# Patient Record
Sex: FEMALE | Race: WHITE | Employment: OTHER | URBAN - METROPOLITAN AREA
[De-identification: names, ages, dates, MRNs, and addresses within clinical notes are randomized per-mention and may not be internally consistent; named-entity substitution may affect disease eponyms.]

---

## 2023-01-09 ENCOUNTER — HOSPITAL ENCOUNTER (OUTPATIENT)
Dept: MAMMOGRAPHY | Facility: CLINIC | Age: 73
Discharge: HOME | End: 2023-01-09
Attending: FAMILY MEDICINE | Admitting: FAMILY MEDICINE
Payer: COMMERCIAL

## 2023-01-09 DIAGNOSIS — Z12.31 VISIT FOR SCREENING MAMMOGRAM: ICD-10-CM

## 2023-01-09 PROCEDURE — 77067 SCR MAMMO BI INCL CAD: CPT

## 2023-03-09 ENCOUNTER — HOSPITAL ENCOUNTER (OUTPATIENT)
Dept: MAMMOGRAPHY | Facility: CLINIC | Age: 73
Discharge: HOME | End: 2023-03-09
Attending: FAMILY MEDICINE
Payer: COMMERCIAL

## 2023-03-09 DIAGNOSIS — R92.8 ABNORMAL MAMMOGRAM: ICD-10-CM

## 2023-03-09 PROCEDURE — 77061 BREAST TOMOSYNTHESIS UNI: CPT | Mod: RT

## 2023-03-09 PROCEDURE — 76642 ULTRASOUND BREAST LIMITED: CPT | Mod: RT

## 2023-03-15 ENCOUNTER — HOSPITAL ENCOUNTER (OUTPATIENT)
Facility: CLINIC | Age: 73
Setting detail: OBSERVATION
Discharge: HOME | End: 2023-03-18
Attending: EMERGENCY MEDICINE | Admitting: EMERGENCY MEDICINE
Payer: COMMERCIAL

## 2023-03-15 DIAGNOSIS — R42 DIZZINESS: ICD-10-CM

## 2023-03-15 DIAGNOSIS — D64.9 ANEMIA, UNSPECIFIED TYPE: ICD-10-CM

## 2023-03-15 LAB
ALBUMIN SERPL BCG-MCNC: 3.8 G/DL (ref 3.5–5.2)
ALP SERPL-CCNC: 69 U/L (ref 35–104)
ALT SERPL W P-5'-P-CCNC: 11 U/L (ref 10–35)
ANION GAP SERPL CALCULATED.3IONS-SCNC: 9 MMOL/L (ref 7–15)
AST SERPL W P-5'-P-CCNC: 19 U/L (ref 10–35)
ATRIAL RATE - MUSE: 114 BPM
BASOPHILS # BLD AUTO: 0.1 10E3/UL (ref 0–0.2)
BASOPHILS NFR BLD AUTO: 1 %
BILIRUB SERPL-MCNC: 0.2 MG/DL
BUN SERPL-MCNC: 19.1 MG/DL (ref 8–23)
CALCIUM SERPL-MCNC: 9.1 MG/DL (ref 8.8–10.2)
CHLORIDE SERPL-SCNC: 105 MMOL/L (ref 98–107)
CREAT SERPL-MCNC: 0.67 MG/DL (ref 0.51–0.95)
DEPRECATED HCO3 PLAS-SCNC: 25 MMOL/L (ref 22–29)
DIASTOLIC BLOOD PRESSURE - MUSE: NORMAL MMHG
EOSINOPHIL # BLD AUTO: 0 10E3/UL (ref 0–0.7)
EOSINOPHIL NFR BLD AUTO: 0 %
ERYTHROCYTE [DISTWIDTH] IN BLOOD BY AUTOMATED COUNT: 11.9 % (ref 10–15)
GFR SERPL CREATININE-BSD FRML MDRD: >90 ML/MIN/1.73M2
GLUCOSE SERPL-MCNC: 107 MG/DL (ref 70–99)
HCT VFR BLD AUTO: 34.8 % (ref 35–47)
HGB BLD-MCNC: 11.3 G/DL (ref 11.7–15.7)
IMM GRANULOCYTES # BLD: 0 10E3/UL
IMM GRANULOCYTES NFR BLD: 0 %
INTERPRETATION ECG - MUSE: NORMAL
LYMPHOCYTES # BLD AUTO: 1.7 10E3/UL (ref 0.8–5.3)
LYMPHOCYTES NFR BLD AUTO: 36 %
MCH RBC QN AUTO: 29.8 PG (ref 26.5–33)
MCHC RBC AUTO-ENTMCNC: 32.5 G/DL (ref 31.5–36.5)
MCV RBC AUTO: 92 FL (ref 78–100)
MONOCYTES # BLD AUTO: 0.4 10E3/UL (ref 0–1.3)
MONOCYTES NFR BLD AUTO: 8 %
NEUTROPHILS # BLD AUTO: 2.6 10E3/UL (ref 1.6–8.3)
NEUTROPHILS NFR BLD AUTO: 55 %
NRBC # BLD AUTO: 0 10E3/UL
NRBC BLD AUTO-RTO: 0 /100
P AXIS - MUSE: 79 DEGREES
PLATELET # BLD AUTO: 274 10E3/UL (ref 150–450)
POTASSIUM SERPL-SCNC: 4.1 MMOL/L (ref 3.4–5.3)
PR INTERVAL - MUSE: 142 MS
PROT SERPL-MCNC: 6.5 G/DL (ref 6.4–8.3)
QRS DURATION - MUSE: 66 MS
QT - MUSE: 306 MS
QTC - MUSE: 421 MS
R AXIS - MUSE: 59 DEGREES
RBC # BLD AUTO: 3.79 10E6/UL (ref 3.8–5.2)
SODIUM SERPL-SCNC: 139 MMOL/L (ref 136–145)
SYSTOLIC BLOOD PRESSURE - MUSE: NORMAL MMHG
T AXIS - MUSE: 0 DEGREES
VENTRICULAR RATE- MUSE: 114 BPM
WBC # BLD AUTO: 4.8 10E3/UL (ref 4–11)

## 2023-03-15 PROCEDURE — 84484 ASSAY OF TROPONIN QUANT: CPT | Performed by: EMERGENCY MEDICINE

## 2023-03-15 PROCEDURE — 85025 COMPLETE CBC W/AUTO DIFF WBC: CPT | Performed by: EMERGENCY MEDICINE

## 2023-03-15 PROCEDURE — 96360 HYDRATION IV INFUSION INIT: CPT | Mod: XU

## 2023-03-15 PROCEDURE — 84439 ASSAY OF FREE THYROXINE: CPT | Performed by: EMERGENCY MEDICINE

## 2023-03-15 PROCEDURE — 83735 ASSAY OF MAGNESIUM: CPT | Performed by: EMERGENCY MEDICINE

## 2023-03-15 PROCEDURE — 99285 EMERGENCY DEPT VISIT HI MDM: CPT | Mod: 25

## 2023-03-15 PROCEDURE — 80053 COMPREHEN METABOLIC PANEL: CPT | Performed by: EMERGENCY MEDICINE

## 2023-03-15 PROCEDURE — 96361 HYDRATE IV INFUSION ADD-ON: CPT

## 2023-03-15 PROCEDURE — 36415 COLL VENOUS BLD VENIPUNCTURE: CPT | Performed by: EMERGENCY MEDICINE

## 2023-03-15 PROCEDURE — 84443 ASSAY THYROID STIM HORMONE: CPT | Performed by: EMERGENCY MEDICINE

## 2023-03-15 PROCEDURE — 93005 ELECTROCARDIOGRAM TRACING: CPT

## 2023-03-16 ENCOUNTER — APPOINTMENT (OUTPATIENT)
Dept: GENERAL RADIOLOGY | Facility: CLINIC | Age: 73
End: 2023-03-16
Attending: EMERGENCY MEDICINE
Payer: COMMERCIAL

## 2023-03-16 ENCOUNTER — APPOINTMENT (OUTPATIENT)
Dept: CT IMAGING | Facility: CLINIC | Age: 73
End: 2023-03-16
Attending: EMERGENCY MEDICINE
Payer: COMMERCIAL

## 2023-03-16 ENCOUNTER — APPOINTMENT (OUTPATIENT)
Dept: CARDIOLOGY | Facility: CLINIC | Age: 73
End: 2023-03-16
Attending: HOSPITALIST
Payer: COMMERCIAL

## 2023-03-16 PROBLEM — D64.9 ANEMIA: Status: ACTIVE | Noted: 2023-03-16

## 2023-03-16 PROBLEM — R42 DIZZINESS: Status: ACTIVE | Noted: 2023-03-16

## 2023-03-16 LAB
ALBUMIN UR-MCNC: NEGATIVE MG/DL
APPEARANCE UR: CLEAR
ATRIAL RATE - MUSE: 83 BPM
BASOPHILS # BLD AUTO: 0.1 10E3/UL (ref 0–0.2)
BASOPHILS NFR BLD AUTO: 1 %
BILIRUB DIRECT SERPL-MCNC: <0.2 MG/DL (ref 0–0.3)
BILIRUB SERPL-MCNC: 0.4 MG/DL
BILIRUB UR QL STRIP: NEGATIVE
COLOR UR AUTO: ABNORMAL
D DIMER PPP FEU-MCNC: <0.27 UG/ML FEU (ref 0–0.5)
DIASTOLIC BLOOD PRESSURE - MUSE: NORMAL MMHG
EOSINOPHIL # BLD AUTO: 0 10E3/UL (ref 0–0.7)
EOSINOPHIL NFR BLD AUTO: 1 %
ERYTHROCYTE [DISTWIDTH] IN BLOOD BY AUTOMATED COUNT: 11.9 % (ref 10–15)
FOLATE SERPL-MCNC: 14.7 NG/ML (ref 4.6–34.8)
GLUCOSE UR STRIP-MCNC: NEGATIVE MG/DL
HCT VFR BLD AUTO: 30.1 % (ref 35–47)
HGB BLD-MCNC: 10 G/DL (ref 11.7–15.7)
HGB BLD-MCNC: 9.6 G/DL (ref 11.7–15.7)
HGB UR QL STRIP: NEGATIVE
HOLD SPECIMEN: NORMAL
IMM GRANULOCYTES # BLD: 0 10E3/UL
IMM GRANULOCYTES NFR BLD: 0 %
INTERPRETATION ECG - MUSE: NORMAL
IRON BINDING CAPACITY (ROCHE): ABNORMAL
IRON SATN MFR SERPL: ABNORMAL %
IRON SERPL-MCNC: 209 UG/DL (ref 37–145)
KETONES UR STRIP-MCNC: NEGATIVE MG/DL
LDH SERPL L TO P-CCNC: 71 U/L (ref 0–250)
LEUKOCYTE ESTERASE UR QL STRIP: NEGATIVE
LVEF ECHO: NORMAL
LYMPHOCYTES # BLD AUTO: 1.5 10E3/UL (ref 0.8–5.3)
LYMPHOCYTES NFR BLD AUTO: 40 %
MAGNESIUM SERPL-MCNC: 2.1 MG/DL (ref 1.7–2.3)
MCH RBC QN AUTO: 30.6 PG (ref 26.5–33)
MCHC RBC AUTO-ENTMCNC: 33.2 G/DL (ref 31.5–36.5)
MCV RBC AUTO: 92 FL (ref 78–100)
MONOCYTES # BLD AUTO: 0.2 10E3/UL (ref 0–1.3)
MONOCYTES NFR BLD AUTO: 6 %
MUCOUS THREADS #/AREA URNS LPF: PRESENT /LPF
NEUTROPHILS # BLD AUTO: 2 10E3/UL (ref 1.6–8.3)
NEUTROPHILS NFR BLD AUTO: 52 %
NITRATE UR QL: NEGATIVE
NRBC # BLD AUTO: 0 10E3/UL
NRBC BLD AUTO-RTO: 0 /100
P AXIS - MUSE: 86 DEGREES
PATH REPORT.COMMENTS IMP SPEC: NORMAL
PATH REPORT.FINAL DX SPEC: NORMAL
PATH REPORT.MICROSCOPIC SPEC OTHER STN: NORMAL
PATH REPORT.MICROSCOPIC SPEC OTHER STN: NORMAL
PH UR STRIP: 5 [PH] (ref 5–7)
PLATELET # BLD AUTO: 208 10E3/UL (ref 150–450)
PR INTERVAL - MUSE: 132 MS
QRS DURATION - MUSE: 68 MS
QT - MUSE: 392 MS
QTC - MUSE: 460 MS
R AXIS - MUSE: 78 DEGREES
RBC # BLD AUTO: 3.27 10E6/UL (ref 3.8–5.2)
RBC URINE: <1 /HPF
RETICS # AUTO: 0.04 10E6/UL (ref 0.03–0.1)
RETICS # AUTO: 0.04 10E6/UL (ref 0.03–0.1)
RETICS/RBC NFR AUTO: 1.2 % (ref 0.5–2)
RETICS/RBC NFR AUTO: 1.3 % (ref 0.5–2)
SP GR UR STRIP: 1.01 (ref 1–1.03)
SQUAMOUS EPITHELIAL: 1 /HPF
SYSTOLIC BLOOD PRESSURE - MUSE: NORMAL MMHG
T AXIS - MUSE: 57 DEGREES
T4 FREE SERPL-MCNC: 0.99 NG/DL (ref 0.9–1.7)
TROPONIN T SERPL HS-MCNC: <6 NG/L
TSH SERPL DL<=0.005 MIU/L-ACNC: 6.56 UIU/ML (ref 0.3–4.2)
UROBILINOGEN UR STRIP-MCNC: NORMAL MG/DL
VENTRICULAR RATE- MUSE: 83 BPM
VIT B12 SERPL-MCNC: 324 PG/ML (ref 232–1245)
WBC # BLD AUTO: 3.9 10E3/UL (ref 4–11)
WBC URINE: 3 /HPF

## 2023-03-16 PROCEDURE — 82607 VITAMIN B-12: CPT | Performed by: HOSPITALIST

## 2023-03-16 PROCEDURE — G0378 HOSPITAL OBSERVATION PER HR: HCPCS

## 2023-03-16 PROCEDURE — 82746 ASSAY OF FOLIC ACID SERUM: CPT | Performed by: HOSPITALIST

## 2023-03-16 PROCEDURE — 70450 CT HEAD/BRAIN W/O DYE: CPT

## 2023-03-16 PROCEDURE — 250N000009 HC RX 250: Performed by: EMERGENCY MEDICINE

## 2023-03-16 PROCEDURE — 93306 TTE W/DOPPLER COMPLETE: CPT

## 2023-03-16 PROCEDURE — 258N000003 HC RX IP 258 OP 636: Performed by: HOSPITALIST

## 2023-03-16 PROCEDURE — 85045 AUTOMATED RETICULOCYTE COUNT: CPT | Performed by: HOSPITALIST

## 2023-03-16 PROCEDURE — 70496 CT ANGIOGRAPHY HEAD: CPT

## 2023-03-16 PROCEDURE — 85018 HEMOGLOBIN: CPT | Performed by: HOSPITALIST

## 2023-03-16 PROCEDURE — 83615 LACTATE (LD) (LDH) ENZYME: CPT | Performed by: HOSPITALIST

## 2023-03-16 PROCEDURE — 36415 COLL VENOUS BLD VENIPUNCTURE: CPT | Performed by: HOSPITALIST

## 2023-03-16 PROCEDURE — 71046 X-RAY EXAM CHEST 2 VIEWS: CPT

## 2023-03-16 PROCEDURE — 258N000003 HC RX IP 258 OP 636: Performed by: EMERGENCY MEDICINE

## 2023-03-16 PROCEDURE — 83550 IRON BINDING TEST: CPT | Performed by: HOSPITALIST

## 2023-03-16 PROCEDURE — 82248 BILIRUBIN DIRECT: CPT | Performed by: HOSPITALIST

## 2023-03-16 PROCEDURE — 99223 1ST HOSP IP/OBS HIGH 75: CPT | Performed by: HOSPITALIST

## 2023-03-16 PROCEDURE — 36415 COLL VENOUS BLD VENIPUNCTURE: CPT | Performed by: EMERGENCY MEDICINE

## 2023-03-16 PROCEDURE — 99207 BLOOD MORPHOLOGY PATHOLOGIST REVIEW: CPT | Performed by: PATHOLOGY

## 2023-03-16 PROCEDURE — 250N000011 HC RX IP 250 OP 636: Performed by: EMERGENCY MEDICINE

## 2023-03-16 PROCEDURE — 81003 URINALYSIS AUTO W/O SCOPE: CPT | Performed by: EMERGENCY MEDICINE

## 2023-03-16 PROCEDURE — 85379 FIBRIN DEGRADATION QUANT: CPT | Performed by: EMERGENCY MEDICINE

## 2023-03-16 PROCEDURE — 70498 CT ANGIOGRAPHY NECK: CPT

## 2023-03-16 PROCEDURE — 93306 TTE W/DOPPLER COMPLETE: CPT | Mod: 26 | Performed by: INTERNAL MEDICINE

## 2023-03-16 RX ORDER — LANOLIN ALCOHOL/MO/W.PET/CERES
3 CREAM (GRAM) TOPICAL
Status: DISCONTINUED | OUTPATIENT
Start: 2023-03-16 | End: 2023-03-18 | Stop reason: HOSPADM

## 2023-03-16 RX ORDER — ACETAMINOPHEN 650 MG/1
650 SUPPOSITORY RECTAL EVERY 6 HOURS PRN
Status: DISCONTINUED | OUTPATIENT
Start: 2023-03-16 | End: 2023-03-18 | Stop reason: HOSPADM

## 2023-03-16 RX ORDER — IOPAMIDOL 755 MG/ML
75 INJECTION, SOLUTION INTRAVASCULAR ONCE
Status: COMPLETED | OUTPATIENT
Start: 2023-03-16 | End: 2023-03-16

## 2023-03-16 RX ORDER — ACETAMINOPHEN 325 MG/1
650 TABLET ORAL EVERY 6 HOURS PRN
Status: DISCONTINUED | OUTPATIENT
Start: 2023-03-16 | End: 2023-03-18 | Stop reason: HOSPADM

## 2023-03-16 RX ORDER — SODIUM CHLORIDE 9 MG/ML
INJECTION, SOLUTION INTRAVENOUS CONTINUOUS
Status: DISCONTINUED | OUTPATIENT
Start: 2023-03-16 | End: 2023-03-18 | Stop reason: HOSPADM

## 2023-03-16 RX ORDER — ONDANSETRON 2 MG/ML
4 INJECTION INTRAMUSCULAR; INTRAVENOUS EVERY 6 HOURS PRN
Status: DISCONTINUED | OUTPATIENT
Start: 2023-03-16 | End: 2023-03-18 | Stop reason: HOSPADM

## 2023-03-16 RX ORDER — ONDANSETRON 4 MG/1
4 TABLET, ORALLY DISINTEGRATING ORAL EVERY 6 HOURS PRN
Status: DISCONTINUED | OUTPATIENT
Start: 2023-03-16 | End: 2023-03-18 | Stop reason: HOSPADM

## 2023-03-16 RX ADMIN — SODIUM CHLORIDE 90 ML: 900 INJECTION INTRAVENOUS at 01:19

## 2023-03-16 RX ADMIN — SODIUM CHLORIDE: 9 INJECTION, SOLUTION INTRAVENOUS at 16:55

## 2023-03-16 RX ADMIN — SODIUM CHLORIDE 1000 ML: 9 INJECTION, SOLUTION INTRAVENOUS at 00:03

## 2023-03-16 RX ADMIN — IOPAMIDOL 75 ML: 755 INJECTION, SOLUTION INTRAVENOUS at 01:18

## 2023-03-16 RX ADMIN — SODIUM CHLORIDE: 9 INJECTION, SOLUTION INTRAVENOUS at 08:00

## 2023-03-16 ASSESSMENT — ACTIVITIES OF DAILY LIVING (ADL)
ADLS_ACUITY_SCORE: 37
ADLS_ACUITY_SCORE: 37
ADLS_ACUITY_SCORE: 35
ADLS_ACUITY_SCORE: 37
ADLS_ACUITY_SCORE: 35
ADLS_ACUITY_SCORE: 37
ADLS_ACUITY_SCORE: 35
ADLS_ACUITY_SCORE: 37
ADLS_ACUITY_SCORE: 35

## 2023-03-16 ASSESSMENT — ENCOUNTER SYMPTOMS
PALPITATIONS: 0
DYSURIA: 0
HEADACHES: 0
APPETITE CHANGE: 0
NAUSEA: 0
WEAKNESS: 1
DIZZINESS: 1
DIAPHORESIS: 1

## 2023-03-16 NOTE — ED TRIAGE NOTES
Pt states she is feeling generalized weakness, diaphoretic, shortness of breath for a brief moment, vague symptoms

## 2023-03-16 NOTE — CONSULTS
[facility]    Hematology / Oncology Consultation     Date of Admission:  3/15/2023    Assessment & Plan   1.  Near syncope associated with post phlebotomy volume depletion.  2.  Anemia post phlebotomy and IV fluids dilution.  3.  Hereditary hemochromatosis treated with 2 doses of phlebotomy so far.  Presented with high ferritin and iron saturation.  4.  Borderline B12 deficiency level 298, declines supplement.  5.  Mild leukopenia.    Discussion and recommendations:  Agree with IV fluids, I discussed with her again the benefit of B12 supplement especially with having lower white count currently and she will think about it and consider organic B12 supplement as an outpatient.  I explained that dizziness and presyncope can be associated post phlebotomy, good hydration before phlebotomy can reduce that risk and I will plan to reduce the amount of phlebotomy to 250 cc going forward, we will continue with discussion about potential IV fluids given at the time of phlebotomy but currently she is opposed to that.  With the low hemoglobin, will hold off phlebotomy until her hemoglobin improves at least to 11.5.  She is tentatively scheduled for phlebotomy in 1 month.        Arcelia Solis MD    Code Status    No CPR- Do NOT Intubate    Reason for Consult   Reason for consult: Near syncope, hemochromatosis    Primary Care Physician   Marilyn Chappell    Chief Complaint     Dizziness.    History of Present Illness   Mara Rao is a 72 year old female who presents with hereditary hemochromatosis She was referred to my office in January 2023 with high iron saturation 46% and ferritin 261 and was found to have homozygous mutation of C282 Y consistent with hemochromatosis.  Initially she was resistant to her family to have treatment but agreed eventually to start phlebotomy, the first 1 was administered on January 10, 2023 and the second was on March 14, 2023.    She had near syncope for  few seconds after her phlebotomy on March 14 and was offered IV fluids but declined.  She came to the hospital with 24-hour history of intermittent dizziness and presyncope.  She had no chest pain no shortness of breath.  She was admitted to to medical floor and currently on IV fluids with saline.  Her hemoglobin in the office was over 13, on admission was 11.3 and currently at 10.  White count 3.9 platelets 208.  D-dimers were less than 0.27 chest x-ray and CT scan of chest unremarkable head CT unremarkable echocardiogram normal ejection fraction.    Past Medical History   I have reviewed this patient's medical history and updated it with pertinent information if needed.   No past medical history on file.    Past Surgical History   I have reviewed this patient's surgical history and updated it with pertinent information if needed.  No past surgical history on file.    Prior to Admission Medications   None     Allergies   No Known Allergies    Social History   I have reviewed this patient's social history and updated it with pertinent information if needed.   She is vegetarian for the most part, she follows a holistic approach and has strong feelings about treatments and supplements.    Family History   I have reviewed this patient's family history and updated it with pertinent information if needed.   No family history on file.    Review of Systems   The 10 point Review of Systems is negative other than noted in the HPI     Physical Exam   Temp: 98.7  F (37.1  C) Temp src: Oral BP: 115/61 Pulse: 89   Resp: 16 SpO2: 98 % O2 Device: None (Room air)    Vital Signs with Ranges  Temp:  [96.4  F (35.8  C)-98.8  F (37.1  C)] 98.7  F (37.1  C)  Pulse:  [] 89  Resp:  [10-30] 16  BP: ()/(42-70) 115/61  SpO2:  [96 %-100 %] 98 %  90 lbs 0 oz    HEENT pallor  Neck: No JVD.  Lungs: No respiratory distress no wheezing.  Abdomen: Nondistended.  Extremities: No edema.  Limited skin examination no petechia or  ecchymosis.    Data   Results for orders placed or performed during the hospital encounter of 03/15/23 (from the past 24 hour(s))   EKG 12-lead, tracing only   Result Value Ref Range    Systolic Blood Pressure  mmHg    Diastolic Blood Pressure  mmHg    Ventricular Rate 114 BPM    Atrial Rate 114 BPM    IN Interval 142 ms    QRS Duration 66 ms     ms    QTc 421 ms    P Axis 79 degrees    R AXIS 59 degrees    T Axis 0 degrees    Interpretation ECG       Sinus tachycardia  Nonspecific T wave abnormality  Abnormal ECG  No previous ECGs available  Confirmed by GENERATED REPORT, COMPUTER ([id number]),  Rafael Olivares ([id number]) on 3/15/2023 10:44:29 PM     CBC with platelets + differential    Narrative    The following orders were created for panel order CBC with platelets + differential.  Procedure                               Abnormality         Status                     ---------                               -----------         ------                     CBC with platelets and d...[[id number]]  Abnormal            Final result                 Please view results for these tests on the individual orders.   Comprehensive metabolic panel   Result Value Ref Range    Sodium 139 136 - 145 mmol/L    Potassium 4.1 3.4 - 5.3 mmol/L    Chloride 105 98 - 107 mmol/L    Carbon Dioxide (CO2) 25 22 - 29 mmol/L    Anion Gap 9 7 - 15 mmol/L    Urea Nitrogen 19.1 8.0 - 23.0 mg/dL    Creatinine 0.67 0.51 - 0.95 mg/dL    Calcium 9.1 8.8 - 10.2 mg/dL    Glucose 107 (H) 70 - 99 mg/dL    Alkaline Phosphatase 69 35 - 104 U/L    AST 19 10 - 35 U/L    ALT 11 10 - 35 U/L    Protein Total 6.5 6.4 - 8.3 g/dL    Albumin 3.8 3.5 - 5.2 g/dL    Bilirubin Total 0.2 <=1.2 mg/dL    GFR Estimate >90 >60 mL/min/1.73m2   CBC with platelets and differential   Result Value Ref Range    WBC Count 4.8 4.0 - 11.0 10e3/uL    RBC Count 3.79 (L) 3.80 - 5.20 10e6/uL    Hemoglobin 11.3 (L) 11.7 - 15.7 g/dL    Hematocrit 34.8 (L) 35.0 - 47.0 %    MCV 92 78 - 100  fL    MCH 29.8 26.5 - 33.0 pg    MCHC 32.5 31.5 - 36.5 g/dL    RDW 11.9 10.0 - 15.0 %    Platelet Count 274 150 - 450 10e3/uL    % Neutrophils 55 %    % Lymphocytes 36 %    % Monocytes 8 %    % Eosinophils 0 %    % Basophils 1 %    % Immature Granulocytes 0 %    NRBCs per 100 WBC 0 <1 /100    Absolute Neutrophils 2.6 1.6 - 8.3 10e3/uL    Absolute Lymphocytes 1.7 0.8 - 5.3 10e3/uL    Absolute Monocytes 0.4 0.0 - 1.3 10e3/uL    Absolute Eosinophils 0.0 0.0 - 0.7 10e3/uL    Absolute Basophils 0.1 0.0 - 0.2 10e3/uL    Absolute Immature Granulocytes 0.0 <=0.4 10e3/uL    Absolute NRBCs 0.0 10e3/uL   Troponin T, High Sensitivity   Result Value Ref Range    Troponin T, High Sensitivity <6 <=14 ng/L   Magnesium   Result Value Ref Range    Magnesium 2.1 1.7 - 2.3 mg/dL   TSH with free T4 reflex   Result Value Ref Range    TSH 6.56 (H) 0.30 - 4.20 uIU/mL   T4 free   Result Value Ref Range    Free T4 0.99 0.90 - 1.70 ng/dL   D dimer quantitative   Result Value Ref Range    D-Dimer Quantitative <0.27 0.00 - 0.50 ug/mL FEU    Narrative    This D-dimer assay is intended for use in conjunction with a clinical pretest probability assessment model to exclude pulmonary embolism (PE) and deep venous thrombosis (DVT) in outpatients suspected of PE or DVT. The cut-off value is 0.50 ug/mL FEU.   Chest XR,  PA & LAT    Narrative    EXAM: XR CHEST 2 VIEWS  LOCATION: [facility]  DATE/TIME: 3/16/2023 1:35 AM    INDICATION: Weakness, SOB  COMPARISON: None.      Impression    IMPRESSION: Mild scarring at the apices of the upper lobes. No acute lung consolidation, pleural effusion or pneumothorax. Normal heart size and pulmonary vascularity.   CTA Head Neck with Contrast    Narrative    EXAM: CT HEAD W/O CONTRAST, CTA HEAD NECK W CONTRAST  LOCATION: [facility]  DATE/TIME: 3/16/2023 1:37 AM    INDICATION: dizziness  COMPARISON: 8/2/2006.  CONTRAST: 75 mL Isovue 370  TECHNIQUE: Head and neck CT  angiogram with IV contrast. Noncontrast head CT followed by axial helical CT images of the head and neck vessels obtained during the arterial phase of intravenous contrast administration. Axial 2D reconstructed images and   multiplanar 3D MIP reconstructed images of the head and neck vessels were performed by the technologist. Dose reduction techniques were used. All stenosis measurements made according to NASCET criteria unless otherwise specified.    FINDINGS:   NONCONTRAST HEAD CT:   INTRACRANIAL CONTENTS: No intracranial hemorrhage, extraaxial collection, or mass effect.  No CT evidence of acute infarct. There is minimal low attenuation within the periventricular and subcortical white matter consistent with minimal vessel ischemia   disease. The ventricular system, basal cisterns and the cortical sulci are consistent with minimal volume loss for age.     VISUALIZED ORBITS/SINUSES/MASTOIDS: No intraorbital abnormality. No paranasal sinus mucosal disease. No middle ear or mastoid effusion.    BONES/SOFT TISSUES: No acute abnormality.    HEAD CTA:  ANTERIOR CIRCULATION: No stenosis/occlusion, aneurysm, or high flow vascular malformation. There is a patent anterior communicating artery.    POSTERIOR CIRCULATION: No stenosis/occlusion, aneurysm, or high flow vascular malformation. Balanced vertebral arteries supply a normal basilar artery.     DURAL VENOUS SINUSES: Expected enhancement of the major dural venous sinuses.    NECK CTA:  RIGHT CAROTID: No measurable stenosis or dissection.    LEFT CAROTID: No measurable stenosis or dissection.    VERTEBRAL ARTERIES: No focal stenosis or dissection. The right vertebral artery is dominant but both vertebral arteries are patent throughout the neck region.    AORTIC ARCH: Classic aortic arch anatomy with no significant stenosis at the origin of the great vessels.    NONVASCULAR STRUCTURES: The lung apices are clear. There are minimal degenerative changes of the cervical  spine.      Impression    IMPRESSION:   HEAD CT:  1.  No CT finding of a mass, hemorrhage or focal area suggestive of acute infarct.  2.  Minimal age related changes.    HEAD CTA:   1.  No discrete vessel occlusion, significant stenosis, aneurysm or high flow vascular malformation involving the arteries of the [place].    NECK CTA:  1.  Normal configuration of the great vessels off the aortic arch with no significant stenosis of their origins.  2.  No significant stenosis or irregularity involving the arteries of the neck criteria.  3.  No radiographic evidence of dissection.   CT Head w/o Contrast    Narrative    EXAM: CT HEAD W/O CONTRAST, CTA HEAD NECK W CONTRAST  LOCATION: [facility]  DATE/TIME: 3/16/2023 1:37 AM    INDICATION: dizziness  COMPARISON: 8/2/2006.  CONTRAST: 75 mL Isovue 370  TECHNIQUE: Head and neck CT angiogram with IV contrast. Noncontrast head CT followed by axial helical CT images of the head and neck vessels obtained during the arterial phase of intravenous contrast administration. Axial 2D reconstructed images and   multiplanar 3D MIP reconstructed images of the head and neck vessels were performed by the technologist. Dose reduction techniques were used. All stenosis measurements made according to NASCET criteria unless otherwise specified.    FINDINGS:   NONCONTRAST HEAD CT:   INTRACRANIAL CONTENTS: No intracranial hemorrhage, extraaxial collection, or mass effect.  No CT evidence of acute infarct. There is minimal low attenuation within the periventricular and subcortical white matter consistent with minimal vessel ischemia   disease. The ventricular system, basal cisterns and the cortical sulci are consistent with minimal volume loss for age.     VISUALIZED ORBITS/SINUSES/MASTOIDS: No intraorbital abnormality. No paranasal sinus mucosal disease. No middle ear or mastoid effusion.    BONES/SOFT TISSUES: No acute abnormality.    HEAD CTA:  ANTERIOR  CIRCULATION: No stenosis/occlusion, aneurysm, or high flow vascular malformation. There is a patent anterior communicating artery.    POSTERIOR CIRCULATION: No stenosis/occlusion, aneurysm, or high flow vascular malformation. Balanced vertebral arteries supply a normal basilar artery.     DURAL VENOUS SINUSES: Expected enhancement of the major dural venous sinuses.    NECK CTA:  RIGHT CAROTID: No measurable stenosis or dissection.    LEFT CAROTID: No measurable stenosis or dissection.    VERTEBRAL ARTERIES: No focal stenosis or dissection. The right vertebral artery is dominant but both vertebral arteries are patent throughout the neck region.    AORTIC ARCH: Classic aortic arch anatomy with no significant stenosis at the origin of the great vessels.    NONVASCULAR STRUCTURES: The lung apices are clear. There are minimal degenerative changes of the cervical spine.      Impression    IMPRESSION:   HEAD CT:  1.  No CT finding of a mass, hemorrhage or focal area suggestive of acute infarct.  2.  Minimal age related changes.    HEAD CTA:   1.  No discrete vessel occlusion, significant stenosis, aneurysm or high flow vascular malformation involving the arteries of the [place].    NECK CTA:  1.  Normal configuration of the great vessels off the aortic arch with no significant stenosis of their origins.  2.  No significant stenosis or irregularity involving the arteries of the neck criteria.  3.  No radiographic evidence of dissection.   Hemoglobin   Result Value Ref Range    Hemoglobin 9.6 (L) 11.7 - 15.7 g/dL   Extra Tube    Narrative    The following orders were created for panel order Extra Tube.  Procedure                               Abnormality         Status                     ---------                               -----------         ------                     Extra Green Top (Lithium...[[id number]]                      Final result                 Please view results for these tests on the individual  orders.   Extra Green Top (Lithium Heparin) Tube   Result Value Ref Range    Hold Specimen [facility]    Vitamin B12   Result Value Ref Range    Vitamin B12 324 232 - 1,245 pg/mL   Reticulocyte count   Result Value Ref Range    % Reticulocyte 1.3 0.5 - 2.0 %    Absolute Reticulocyte 0.040 0.025 - 0.095 10e6/uL   UA with Microscopic reflex to Culture    Specimen: Urine, Midstream   Result Value Ref Range    Color Urine Light Yellow Colorless, Straw, Light Yellow, Yellow    Appearance Urine Clear Clear    Glucose Urine Negative Negative mg/dL    Bilirubin Urine Negative Negative    Ketones Urine Negative Negative mg/dL    Specific Gravity Urine 1.015 1.003 - 1.035    Blood Urine Negative Negative    pH Urine 5.0 5.0 - 7.0    Protein Albumin Urine Negative Negative mg/dL    Urobilinogen Urine Normal Normal, 2.0 mg/dL    Nitrite Urine Negative Negative    Leukocyte Esterase Urine Negative Negative    Mucus Urine Present (A) None Seen /LPF    RBC Urine <1 <=2 /HPF    WBC Urine 3 <=5 /HPF    Squamous Epithelials Urine 1 <=1 /HPF    Narrative    Urine Culture not indicated   Lab Blood Morphology Pathologist Review    Narrative    The following orders were created for panel order Lab Blood Morphology Pathologist Review.  Procedure                               Abnormality         Status                     ---------                               -----------         ------                     Bld morphology pathology...[[id number]]                      Final result               CBC with platelets and d...[[id number]]  Abnormal            Final result               Reticulocyte count[[id number]]           Normal              Final result               Morphology Tracking[[id number]]                              Final result                 Please view results for these tests on the individual orders.   Lactate Dehydrogenase   Result Value Ref Range    Lactate Dehydrogenase 71 0 - 250 U/L   Bilirubin Direct and Total   Result Value Ref  Range    Bilirubin Direct <0.20 0.00 - 0.30 mg/dL    Bilirubin Total 0.4 <=1.2 mg/dL   Bld morphology pathology review   Result Value Ref Range    Final Diagnosis       Peripheral blood, morphology:  - Mild anemia, normocytic and normochromic.  - WBC subsets quantitatively within normal limits and without diagnostic morphologic abnormality.  - Platelets quantitatively within normal limits and without diagnostic morphologic abnormality.  - Negative for schistocytes.  - Negative for overt features of myelodysplasia or circulating blasts.    See comment.    COMMENT:  Normocytic anemia is nonspecific and may be attributable to multiple overlapping etiologies, including chronic disease, renal and/or endocrine disease, blood loss, iron deficiency (in some patients), and/or bone marrow suppression (by underlying infection, various nutritional deficiencies, toxicities, alcohol use, or medications).  Clinical correlation is recommended.       Peripheral Smear       A microscopic examination is performed.    For patients over 18 years old, anemia and quantitative abnormalities of WBC and platelets (if present) may be further stratified as follows:    Hemoglobin (g/dL) in females:    9.7 - 11.6: Mild anemia    7.7 - 9.6: Moderate anemia    Less than 7.7: Marked anemia    WBC (10^9/L):    Greater than 50.0: Marked leukocytosis    18.0 - 50.0: Moderate leukocytosis    11.1 - 17.9: Mild leukocytosis    3.0 - 3.9: Mild leukopenia    2.0 - 2.9: Moderate leukopenia    Less than 2.0: Marked leukopenia    Platelets (10^9/L):    Greater than 900: Marked thrombocytosis    601 - 900: Moderate thrombocytosis    451 - 600: Mild thrombocytosis    100 - 149: Mild thrombocytopenia    50 - 99: Moderate thrombocytopenia    Less than 50: Marked thrombocytopenia       Peripheral Hematologic Data        Latest Reference Range & Units 03/16/23 11:06   WBC 4.0 - 11.0 10e3/uL 3.9 (L)   Hemoglobin 11.7 - 15.7 g/dL 10.0 (L)   Hematocrit 35.0 - 47.0 %  30.1 (L)   Platelet Count 150 - 450 10e3/uL 208   RBC Count 3.80 - 5.20 10e6/uL 3.27 (L)   MCV 78 - 100 fL 92   MCH 26.5 - 33.0 pg 30.6   MCHC 31.5 - 36.5 g/dL 33.2   RDW 10.0 - 15.0 % 11.9   % Neutrophils % 52   % Lymphocytes % 40   % Monocytes % 6   % Eosinophils % 1   % Basophils % 1   Absolute Basophils 0.0 - 0.2 10e3/uL 0.1   Absolute Eosinophils 0.0 - 0.7 10e3/uL 0.0   Absolute Immature Granulocytes <=0.4 10e3/uL 0.0   Absolute Lymphocytes 0.8 - 5.3 10e3/uL 1.5   Absolute Monocytes 0.0 - 1.3 10e3/uL 0.2   % Immature Granulocytes % 0   Absolute Neutrophils 1.6 - 8.3 10e3/uL 2.0   Absolute NRBCs 10e3/uL 0.0   NRBCs per 100 WBC <1 /100 0   % Retic 0.5 - 2.0 % 1.2   Absolute Retic 0.025 - 0.095 10e6/uL 0.039   (L): Data is abnormally low      Performing Labs       The technical component of this testing was completed at [facility], [facility] and [facility]     CBC with platelets and differential   Result Value Ref Range    WBC Count 3.9 (L) 4.0 - 11.0 10e3/uL    RBC Count 3.27 (L) 3.80 - 5.20 10e6/uL    Hemoglobin 10.0 (L) 11.7 - 15.7 g/dL    Hematocrit 30.1 (L) 35.0 - 47.0 %    MCV 92 78 - 100 fL    MCH 30.6 26.5 - 33.0 pg    MCHC 33.2 31.5 - 36.5 g/dL    RDW 11.9 10.0 - 15.0 %    Platelet Count 208 150 - 450 10e3/uL    % Neutrophils 52 %    % Lymphocytes 40 %    % Monocytes 6 %    % Eosinophils 1 %    % Basophils 1 %    % Immature Granulocytes 0 %    NRBCs per 100 WBC 0 <1 /100    Absolute Neutrophils 2.0 1.6 - 8.3 10e3/uL    Absolute Lymphocytes 1.5 0.8 - 5.3 10e3/uL    Absolute Monocytes 0.2 0.0 - 1.3 10e3/uL    Absolute Eosinophils 0.0 0.0 - 0.7 10e3/uL    Absolute Basophils 0.1 0.0 - 0.2 10e3/uL    Absolute Immature Granulocytes 0.0 <=0.4 10e3/uL    Absolute NRBCs 0.0 10e3/uL   Reticulocyte count   Result Value Ref Range    % Reticulocyte 1.2 0.5 - 2.0 %    Absolute Reticulocyte 0.039 0.025 - 0.095 10e6/uL   Iron  and iron binding capacity   Result Value Ref Range    Iron 209 (H) 37 - 145 ug/dL    Iron Binding Capacity      Iron Sat Index     EKG 12-lead, tracing only   Result Value Ref Range    Systolic Blood Pressure  mmHg    Diastolic Blood Pressure  mmHg    Ventricular Rate 83 BPM    Atrial Rate 83 BPM    AR Interval 132 ms    QRS Duration 68 ms     ms    QTc 460 ms    P Axis 86 degrees    R AXIS 78 degrees    T Axis 57 degrees    Interpretation ECG       Sinus rhythm  Normal ECG  When compared with ECG of 15-MAR-2023 20:26,  T wave inversion no longer evident in Inferior leads  Confirmed by GENERATED REPORT, COMPUTER ([id number]),  Ricco Mitchell ([id number]) on 3/16/2023 1:24:02 PM     Echocardiogram Complete   Result Value Ref Range    LVEF  60-65%     Narrative    [id number]  [id number]  [id number]  [id number]^[email or web address]^AMARJIT^SHAGGY     [facility]  Echocardiography Laboratory  [address] [place] [id number]     Name: MARIO HOANG  MRN: [id number]  : 1950  Study Date: 2023 10:07 AM  Age: 72 yrs  Gender: Female  Patient Location: [facility]  Reason For Study: Dizziness  Ordering Physician: AMARJIT RAYMOND  Performed By: Lissy Arenas RDCS     BSA: 1.3 m2  Height: 60 in  Weight: 90 lb  HR: 82  BP: 100/50 mmHg  ______________________________________________________________________________  Procedure  Complete Portable Echo Adult.  ______________________________________________________________________________  Interpretation Summary     1. Normal biventricular size and function. Left ventricular ejection fraction  of 60-65%.  2. No segmental wall motion abnormalities noted.  3. No hemodynamically significant valvular disease.  No prior study for comparison.  ______________________________________________________________________________  Left Ventricle  The left ventricle is normal in size. There is normal left ventricular wall  thickness. Left ventricular systolic function is normal. The  visual ejection  fraction is 60-65%. Left ventricular diastolic function is normal. No regional  wall motion abnormalities noted.     Right Ventricle  The right ventricle is normal in size and function.     Atria  Normal left atrial size. Right atrial size is normal.     Mitral Valve  The mitral valve leaflets appear normal. There is no evidence of stenosis,  fluttering, or prolapse. There is trace mitral regurgitation.     Tricuspid Valve  Normal tricuspid valve. There is trace tricuspid regurgitation.     Aortic Valve  The aortic valve is not well visualized. There is trace aortic regurgitation.     Pulmonic Valve  The pulmonic valve is not well visualized.     Vessels  Normal size aorta. Normal ascending, transverse (arch), and descending aorta.  IVC diameter <2.1 cm collapsing >50% with sniff suggests a normal RA pressure  of 3 mmHg.     Pericardium  There is no pericardial effusion.     Rhythm  Sinus rhythm was noted.  ______________________________________________________________________________  MMode/2D Measurements & Calculations     IVSd: 0.58 cm  LVIDd: 3.7 cm  LVIDs: 2.5 cm  LVPWd: 0.62 cm  FS: 32.2 %  LV mass(C)d: 57.6 grams  LV mass(C)dI: 43.3 grams/m2  Ao root diam: 2.7 cm  LA dimension: 2.3 cm  LA/Ao: 0.85  RWT: 0.33     Doppler Measurements & Calculations  MV E max jose miguel: 101.8 cm/sec  MV A max jose miguel: 85.5 cm/sec  MV E/A: 1.2  MV dec time: 0.17 sec  PA acc time: 0.12 sec  TR max jose miguel: 241.3 cm/sec  TR max P.3 mmHg  E/E' avg: 10.0  Lateral E/e': 9.5  Medial E/e': 10.5     ______________________________________________________________________________  Report approved by: Marilyn Carlisle 2023 01:39 PM

## 2023-03-16 NOTE — PROGRESS NOTES
Observation Goals:     -diagnostic tests and consults completed and resulted-Not met  -vital signs normal or at patient baseline-Met    Nurse to notify provider when observation goals have been met and patient is ready for discharge.

## 2023-03-16 NOTE — ED PROVIDER NOTES
"  History     Chief Complaint:  Dizziness    The history is provided by the patient and a relative.      Mara Rao is a 72 year old female who presents with dizziness. The patient reports experiencing an episode of dizziness approximately 5 hours ago which she describes as feeling as if she were bouncing on waves. She states that she also experienced a few episodes while in the waiting room, and they lasted for about 20 seconds to a minute at a time. She states that the episodes have subsided over the past 45 minutes. She reports mild chest pain associated with each episode as well as diaphoresis and diffuse tingling. She also reports feeling generally weak and nearly falling during the episodes. She states that she has never experienced anything like this before. She denies any associated headache, nausea or palpitations. She denies any recent dysuria or other urine changes and she has been eating and drinking normally. She mentions that she had an oncology visit yesterday during which they took one pint of blood, as she was apparently recently noted to have a high hemoglobin. She states that she felt fine during the visit, however one of the nurses told her that they seemed to have \"lost her for a minute\" at one point. Lastly, she denies taking any daily medications.     Independent Historian:   Son - They report that the patient remained coherent during the episodes of dizziness and was able to speak clearly.     Review of External Notes: None    ROS:  Review of Systems   Constitutional: Positive for diaphoresis. Negative for appetite change.   Cardiovascular: Positive for chest pain. Negative for palpitations.   Gastrointestinal: Negative for nausea.   Genitourinary: Negative for dysuria.   Neurological: Positive for dizziness and weakness. Negative for headaches.   All other systems reviewed and are negative.      Allergies:  No Known Allergies     Medications:    The patient is currently on no " regular medications.    Past Medical History:    The patient denies any significant past medical history.    Social History:  Presents to the ED with her son  PCP: Marilyn Chappell     Physical Exam     Patient Vitals for the past 24 hrs:   BP Temp Temp src Pulse Resp SpO2 Height Weight   03/15/23 2044 -- 98.8  F (37.1  C) Temporal -- -- -- 1.524 m (5') 40.8 kg (90 lb)   03/15/23 2032 116/65 (!) 96.4  F (35.8  C) -- 103 17 99 % -- --        Physical Exam  Nursing note and vitals reviewed.  Constitutional:  Oriented to person, place, and time. Cooperative.   HENT:   Nose:    Nose normal.   Mouth/Throat:   Facemask in place.   Eyes:    Conjunctivae normal and EOM are normal.      Pupils are equal, round, and reactive to light.   Neck:    Trachea normal.   Cardiovascular:  Tachycardic rate, regular rhythm, normal heart sounds and normal pulses. No murmur heard.  Pulmonary/Chest:  Effort normal and breath sounds normal.   Abdominal:   Soft. Normal appearance and bowel sounds are normal.      There is no tenderness.      There is no rebound and no CVA tenderness.   Musculoskeletal:  Extremities atraumatic x 4.   Lymphadenopathy:  No cervical adenopathy.   Neurological:   Alert and oriented to person, place, and time. Normal strength.      No cranial nerve deficit or sensory deficit. GCS eye subscore is 4. GCS verbal subscore is 5. GCS motor subscore is 6.   Skin:    Skin is intact. No rash noted.   Psychiatric:   Normal mood and affect.      Emergency Department Course   ECG  ECG results from 03/15/23   EKG 12-lead, tracing only     Value    Systolic Blood Pressure     Diastolic Blood Pressure     Ventricular Rate 114    Atrial Rate 114    CT Interval 142    QRS Duration 66        QTc 421    P Axis 79    R AXIS 59    T Axis 0    Interpretation ECG      Sinus tachycardia  Nonspecific T wave abnormality  Abnormal ECG  No previous ECGs available  Confirmed by GENERATED REPORT, COMPUTER ([id number]),  Rafael Olivares  ([id number]) on 3/15/2023 10:44:29 PM       Imaging:  CT Head w/o Contrast   Final Result   IMPRESSION:    HEAD CT:   1.  No CT finding of a mass, hemorrhage or focal area suggestive of acute infarct.   2.  Minimal age related changes.      HEAD CTA:    1.  No discrete vessel occlusion, significant stenosis, aneurysm or high flow vascular malformation involving the arteries of the [place].      NECK CTA:   1.  Normal configuration of the great vessels off the aortic arch with no significant stenosis of their origins.   2.  No significant stenosis or irregularity involving the arteries of the neck criteria.   3.  No radiographic evidence of dissection.      CTA Head Neck with Contrast   Final Result   IMPRESSION:    HEAD CT:   1.  No CT finding of a mass, hemorrhage or focal area suggestive of acute infarct.   2.  Minimal age related changes.      HEAD CTA:    1.  No discrete vessel occlusion, significant stenosis, aneurysm or high flow vascular malformation involving the arteries of the [place].      NECK CTA:   1.  Normal configuration of the great vessels off the aortic arch with no significant stenosis of their origins.   2.  No significant stenosis or irregularity involving the arteries of the neck criteria.   3.  No radiographic evidence of dissection.      Chest XR,  PA & LAT   Final Result   IMPRESSION: Mild scarring at the apices of the upper lobes. No acute lung consolidation, pleural effusion or pneumothorax. Normal heart size and pulmonary vascularity.         Report per radiology    Laboratory:  Labs Ordered and Resulted from Time of ED Arrival to Time of ED Departure   COMPREHENSIVE METABOLIC PANEL - Abnormal       Result Value    Sodium 139      Potassium 4.1      Chloride 105      Carbon Dioxide (CO2) 25      Anion Gap 9      Urea Nitrogen 19.1      Creatinine 0.67      Calcium 9.1      Glucose 107 (*)     Alkaline Phosphatase 69      AST 19      ALT 11      Protein Total 6.5      Albumin  3.8      Bilirubin Total 0.2      GFR Estimate >90     CBC WITH PLATELETS AND DIFFERENTIAL - Abnormal    WBC Count 4.8      RBC Count 3.79 (*)     Hemoglobin 11.3 (*)     Hematocrit 34.8 (*)     MCV 92      MCH 29.8      MCHC 32.5      RDW 11.9      Platelet Count 274      % Neutrophils 55      % Lymphocytes 36      % Monocytes 8      % Eosinophils 0      % Basophils 1      % Immature Granulocytes 0      NRBCs per 100 WBC 0      Absolute Neutrophils 2.6      Absolute Lymphocytes 1.7      Absolute Monocytes 0.4      Absolute Eosinophils 0.0      Absolute Basophils 0.1      Absolute Immature Granulocytes 0.0      Absolute NRBCs 0.0     TSH WITH FREE T4 REFLEX - Abnormal    TSH 6.56 (*)    D DIMER QUANTITATIVE - Normal    D-Dimer Quantitative <0.27     TROPONIN T, HIGH SENSITIVITY - Normal    Troponin T, High Sensitivity <6     MAGNESIUM - Normal    Magnesium 2.1     T4 FREE - Normal    Free T4 0.99     ROUTINE UA WITH MICROSCOPIC REFLEX TO CULTURE     Emergency Department Course & Assessments:       Interventions:  Medications   0.9% sodium chloride BOLUS (0 mLs Intravenous Stopped 3/16/23 0212)   iopamidol (ISOVUE-370) solution 75 mL (75 mLs Intravenous $Given 3/16/23 0118)   sodium chloride 0.9 % bag 500mL for CT scan flush use (90 mLs Intravenous $Given 3/16/23 0119)        Assessments:  2330 I obtained history and examined the patient as noted above.   0234 Patient rechecked and updated.     Independent Interpretation (X-rays, CTs, rhythm strip):  I interpreted the patient's chest X-ray, which does not appear to show anything acute such as an infiltrate or pulmonary edema.    Consultations/Discussion of Management or Tests:  0252 I spoke with Dr. Weiss of the hospitalist services who is in agreement to accept the patient for admission.     Social Determinants of Health affecting care:   None    Disposition:  The patient was admitted to the hospital under the care of Dr. Weiss.    Impression & Plan       Medical Decision Making:  This is a 72-year-old female who came in for further evaluation of episodes of dizziness.  Her episodes are quite short in duration, but they have occurred a few times.  I considered a number of potential causes such as vertebrobasilar insufficiency or possibly an intracranial hemorrhage or stroke.  I also considered the possibility of an arrhythmia or pulmonary embolism, as well as an electrolyte disturbance or anemia.  In fact, her hemoglobin actually is on the low side.  However this does not appear to be secondary to orthostatic hypotension.  I also do not feel that this is likely from her lower hemoglobin, as the episodes appear to happen even while she is just sitting.  Her D-dimer is negative, and therefore I feel that pulmonary embolism has been sufficiently ruled out  I also obtained a chest x-ray to rule out pneumonia or pulmonary edema, and that is unremarkable.  Additionally, I obtained a CT scan of her head as well as a CTA of her head and neck to look for any issues with her vessels.  Her work-up here is unremarkable, which is reassuring.  However it also does not provide a cause of her symptoms.  After an extensive discussion with her and her son, she feels better staying in the hospital for observation, which I think is reasonable.  I subsequently spoke with Dr. Weiss, who will be taking care of her.    Diagnosis:    ICD-10-CM    1. Dizziness  R42       2. Anemia, unspecified type  D64.9          Scribe Disclosure:  I, Mara Tapia, am serving as a scribe at 11:24 PM on 3/15/2023 to document services personally performed by Jerry Ashton MD based on my observations and the provider's statements to me.   3/15/2023   Jerry Ashton MD Lashkowitz, Seth H, MD  03/16/23 [id number]

## 2023-03-16 NOTE — ED NOTES
[facility]  ED Nurse Handoff Report    ED Chief complaint: Generalized Weakness (Diaphoretic, )      ED Diagnosis:   Final diagnoses:   Dizziness   Anemia, unspecified type       Code Status: to be address yet    Allergies: No Known Allergies    Patient Story:came today from home due to dizziness and periodic episode of passing out,and said it feels as if suffocating since 1830 and eperienced even while in waiting area.  Denies SOB,minimal chest discomfort but had sharp hip pain earlier.  Focused Assessment:  Not distress,vitally stable,alert,lives alone.    Treatments and/or interventions provided: iv access,labs,cxr,ct  Patient's response to treatments and/or interventions: stable    To be done/followed up on inpatient unit:  as per admission    Does this patient have any cognitive concerns?: none    Activity level - Baseline/Home:  Independent  Activity Level - Current:   Independent    Patient's Preferred language: English   Needed?: No    Isolation: None  Infection: Not Applicable  Patient tested for COVID 19 prior to admission: NO  Bariatric?: No    Vital Signs:   Vitals:    03/15/23 2032 03/15/23 2044 03/16/23 0300   BP: 116/65  100/45   Pulse: 103  78   Resp: 17  12   Temp: (!) 96.4  F (35.8  C) 98.8  F (37.1  C)    TempSrc:  Temporal    SpO2: 99%  98%   Weight:  40.8 kg (90 lb)    Height:  1.524 m (5')        Cardiac Rhythm:     Was the PSS-3 completed:   Yes  What interventions are required if any?               Family Comments: none  OBS brochure/video discussed/provided to patient/family: No              Name of person given brochure if not patient: none              Relationship to patient: none    For the majority of the shift this patient's behavior was Green.   Behavioral interventions performed were none.    ED NURSE PHONE NUMBER: [phone]

## 2023-03-16 NOTE — ED NOTES
Received this patient with complaints of periodic episode of passing out ,dizziness and as if she feels suffocated since 1830,denies sob,having minimal chest pain and experienced sharp hip pain as well but none as of the moment.  She states  code status  is DNR/DNI.
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
## 2023-03-16 NOTE — PROGRESS NOTES
RECEIVING UNIT ED HANDOFF REVIEW    ED Nurse Handoff Report was reviewed by: Carlotta Foy RN on March 16, 2023 at 3:32 PM

## 2023-03-16 NOTE — H&P
[facility]    History and Physical  Hospitalist     Date of Admission:  3/15/2023    Assessment & Plan   Mara Rao is a 72 year old female with a recent diagnosis of polycythemia presents to hospital with multiple near syncopal episodes.     Near syncope  Patient presenting with multiple episodes of dizziness starting on Wednesday evening.  Each episode is nonexertional resolving within a few seconds.  Initial episode was associated with chest pain and a repeat episode in the emergency room was associated with tingling in the left arm.  Episodes are not elicited with head movement or changing position.  ACS ruled out with EKG and troponin in the emergency room  Source of the patient's episodes of near syncope is not clear.  Differentials include symptomatic anemia due to phlebotomy, although her symptoms did not start until over 24 hours after phlebotomy making this seem less likely.  Additionally her episodes have only occurred while at rest and seated, but have not occurred while she is ambulating.  Arrhythmia could explain her symptoms, unfortunately none of her episodes in the emergency room occurred while on telemetry.  We will monitor her overnight on telemetry.  Given her chest pain and left arm numbness I believe the patient would benefit from a stress test if telemetry does not reveal any arrhythmias overnight.  A stroke seems unlikely given that her symptoms are intermittent.  CT scan of the head as well as CTA of the head and neck was negative for acute infarct or any significant occlusion of the vessels.  No deficits were noted on neurologic exam.  -Monitor on telemetry  -Stress test  -Follow-up repeat hemoglobin in the morning      Code Status   DNR / DNI  DVT ppx: ambulate  Expected length of stay less than 2 days    Clinically Significant Risk Factors Present on Admission                       # Cachexia: Estimated body mass index is 17.58 kg/m  as calculated from  "the following:    Height as of this encounter: 1.524 m (5').    Weight as of this encounter: 40.8 kg (90 lb).           Primary Care Physician   Marilyn Chappell    Chief Complaint   Generalized Weakness (Diaphoretic, )    History obtained from patient    History of Present Illness   Mara Rao is a 72 year old female with a recent diagnosis of polycythemia presents to hospital with multiple near syncopal episodes.  The patient has been very healthy and has no known medical conditions nor does she take any medications however recently on a blood draw her hemoglobin was noted to be elevated and her PCP recommended phlebotomy for suspected polycythemia.  On Tuesday she underwent phlebotomy and had 1 pint of blood removed.  After phlebotomy the patient reports that the nurse did become concerned with the way the patient looked; however, she improved rapidly and was asymptomatic so they sent her home.  The patient was well until Wednesday evening when she had an episode of dizziness with a sensation described as \"riding on a dolphin.\"  The episode started while she was seated and on a Zoom call.  She got off the call and lie down and her symptoms resolved within a matter of seconds.  Associated with the episode of dizziness was a sensation of lightheadedness like she was going to pass out as well as some mild chest pain described as a 3 out of 10.  She reports feeling clammy and diaphoretic with the episode.  She became concerned and called her son who brought her into the hospital.  In the waiting room she had additional episodes for a total of 6 episodes.  During one of the episodes in the emergency room she experienced tingling of the left arm.  All the episodes occurred while she was in a seated position and were nonexertional.  All episodes lasted less than a minute.  She denies any other symptoms such as fevers, chills, nausea, vomiting, diarrhea, constipation, dysuria, dyspnea, cough, changes in her " hearing, recent travel, sick contacts.    Past Medical History    I have reviewed this patient's medical history and updated it with pertinent information if needed.   No past medical history on file.  Polycythemia?  She reports that she has not received the diagnosis but was started on phlebotomy for an elevated hematocrit.    Past Surgical History   I have reviewed this patient's surgical history and updated it with pertinent information if needed.  No past surgical history on file.  Lumpectomy  Tubal ligation    Allergies   No Known Allergies    Social History   I have reviewed this patient's social history and updated it with pertinent information if needed. Mara Rao  She does not smoke or drink.  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  She walks regularly without limitation or chest pain.    Family History   I have reviewed this patient's family history and updated it with pertinent information if needed.  The patient was enclosed with her parents due to mental illnesses they had and is not aware of any medical conditions around family.    Review of Systems   The 10 point Review of Systems is negative other than noted in the HPI or here.     Physical Exam   Temp: 98.8  F (37.1  C) Temp src: Temporal BP: 116/65 Pulse: 103   Resp: 17 SpO2: 99 % O2 Device: None (Room air)    Vital Signs with Ranges  Temp:  [96.4  F (35.8  C)-98.8  F (37.1  C)] 98.8  F (37.1  C)  Pulse:  [103] 103  Resp:  [17] 17  BP: (116)/(65) 116/65  SpO2:  [99 %] 99 %  90 lbs 0 oz  Physical Exam  Vitals reviewed.   Constitutional:       Appearance: Normal appearance.   HENT:      Head: Normocephalic and atraumatic.      Right Ear: External ear normal.      Left Ear: External ear normal.      Mouth/Throat:      Mouth: Mucous membranes are moist.      Pharynx: Oropharynx is clear.   Eyes:      Extraocular Movements: Extraocular movements intact.       Conjunctiva/sclera: Conjunctivae normal.      Pupils: Pupils are equal, round, and reactive to light.   Cardiovascular:      Rate and Rhythm: Normal rate and regular rhythm.      Pulses: Normal pulses.      Heart sounds: Normal heart sounds. No murmur heard.  Pulmonary:      Effort: Pulmonary effort is normal.      Breath sounds: Normal breath sounds. No wheezing, rhonchi or rales.   Abdominal:      General: Abdomen is flat. Bowel sounds are normal. There is no distension.      Palpations: Abdomen is soft. There is no mass.      Tenderness: There is no abdominal tenderness.   Musculoskeletal:         General: No swelling. Normal range of motion.      Cervical back: Normal range of motion and neck supple.   Skin:     General: Skin is warm and dry.   Neurological:      General: No focal deficit present.      Mental Status: She is alert and oriented to person, place, and time. Mental status is at baseline.      Cranial Nerves: No cranial nerve deficit.      Sensory: No sensory deficit.      Motor: No weakness.      Coordination: Coordination normal.      Comments: No dizziness on head movement or change in position.            Medical Decision Making       75 MINUTES SPENT BY ME on the date of service doing chart review, history, exam, documentation & further activities per the note.

## 2023-03-16 NOTE — PROGRESS NOTES
"[facility]    Medicine Progress Note - Hospitalist Service    Date of Admission:  3/15/2023    Assessment & Plan   Mara Rao, 72 year old female with no significant past medical history admitted 3/15/2023 for intermittent spells of dizziness, presyncope, weakness.    Intermittent spells of dizziness, presyncope  Anemia, normocytic  Weakness  -Hospital admission for 1 day history of intermittent spells, dizziness, and presyncope.  Otherwise healthy per patient report with no significant past medical history.  Initial stroke work-up in ER on admission negative.  Reportedly diagnosed with \"polycythemia\" prior to admission with 1 unit phlebotomy per patient 2 days prior to admission.  Limited records regarding recent work-up, evaluation, and a formal diagnosis.  -Initial stroke work-up on admission with negative head CT and head/neck CTA.    Indeterminate cause of symptoms, rule out dysrhythmia with ongoing cardiac telemetry.  Transthoracic echocardiogram to rule out valvular heart disease or ventricular dysfunction.  Underlying anemia, normocytic, likely symptomatic in combination with orthostasis documented in the emergency room.  Additional labs requested including B12, folic acid, reticulocyte count, peripheral blood smear, LDH, and bilirubin.  Formal hematology consultation was requested for anemia and reported history of \"polycythemia\" resulting in recent outpatient phlebotomy.  Monitor hemoglobin.  Physical therapy consulted.  Hold off on cardiac stress testing discussed on admission pending transthoracic echo results, recheck hemoglobin, and hematology input.    Disposition     Estimated length of stay 24 to 48 hours    Anticipated discharge to home    Total time spent caring for patient today thus far 50 minutes.  Total time spent reviewing medical records, interviewing and examining patient, ordering and reviewing test results, ordering subspecialty consult, communicating " "with nursing staff and ER, implementing clinical decision making and coordinating care, documenting, reviewing care plan at the bedside, and providing patient education.       Diet: Regular Diet Adult    DVT Prophylaxis: Pneumatic Compression Devices  Martinez Catheter: Not present  Lines: None     Cardiac Monitoring: ACTIVE order. Indication: Syncope- low cardiac risk (24 hours)  Code Status: No CPR- Do NOT Intubate      Clinically Significant Risk Factors Present on Admission                       # Cachexia: Estimated body mass index is 17.58 kg/m  as calculated from the following:    Height as of this encounter: 1.524 m (5').    Weight as of this encounter: 40.8 kg (90 lb).           Disposition Plan      Expected Discharge Date: 03/17/2023                  Josehp Pena MD  Hospitalist Service  [facility]  Securely message with imagine (more info)  Text page via [employer] Paging/Directory   ______________________________________________________________________    Interval History   First visit with patient this morning in the emergency room, admitted overnight for dizziness, weakness, and indeterminant spells she describes as \"riding a porpose on waves\".  No significant past medical history.  Reports being diagnosed with \"polycythemia\" in the recent past with a \"pint\" of phlebotomy 2 days prior to admission.  No recent records available for review.  Denies previous hematology consultation for formal diagnosis.  No complaints of dyspnea or chest pain.  No palpitations or syncope.  Multiple episodes of presyncope reported prior to admission and in the emergency room.  Denies headache or focal neurologic deficits with intermittent spells.  On cardiac telemetry with transthoracic echocardiogram ordered.  Formal hematology consultation also requested.    Physical Exam   Vital Signs: Temp: 98.8  F (37.1  C) Temp src: Temporal BP: 100/50 Pulse: 77   Resp: 13 SpO2: 97 % O2 Device: None (Room " air)    Weight: 90 lbs 0 oz    Lying in bed, awake and alert, in good spirits, answering questions and following simple commands  Lungs clear with good inspiratory effort, no wheezes or crackles  Heart S1-S2 with regular rhythm, no rubs or gallops  Abdomen soft, nontender without rebound or rigidity  Extremities without edema  Neurologic moves upper and lower extremities spontaneously and to command  Mental status awake alert, talkative and interactive    Medical Decision Making             Data     I have personally reviewed the following data over the past 24 hrs:    4.8  \   9.6 (L)   / 274     139 105 19.1 /  107 (H)   4.1 25 0.67 \       ALT: 11 AST: 19 AP: 69 TBILI: 0.2   ALB: 3.8 TOT PROTEIN: 6.5 LIPASE: N/A       Trop: <6 BNP: N/A       TSH: 6.56 (H) T4: 0.99 A1C: N/A       INR:  N/A PTT:  N/A   D-dimer:  <0.27 Fibrinogen:  N/A       Ferritin:  N/A % Retic:  1.3 LDH:  N/A       Imaging results reviewed over the past 24 hrs:   Recent Results (from the past 24 hour(s))   Chest XR,  PA & LAT    Narrative    EXAM: XR CHEST 2 VIEWS  LOCATION: [facility]  DATE/TIME: 3/16/2023 1:35 AM    INDICATION: Weakness, SOB  COMPARISON: None.      Impression    IMPRESSION: Mild scarring at the apices of the upper lobes. No acute lung consolidation, pleural effusion or pneumothorax. Normal heart size and pulmonary vascularity.   CTA Head Neck with Contrast    Narrative    EXAM: CT HEAD W/O CONTRAST, CTA HEAD NECK W CONTRAST  LOCATION: [facility]  DATE/TIME: 3/16/2023 1:37 AM    INDICATION: dizziness  COMPARISON: 8/2/2006.  CONTRAST: 75 mL Isovue 370  TECHNIQUE: Head and neck CT angiogram with IV contrast. Noncontrast head CT followed by axial helical CT images of the head and neck vessels obtained during the arterial phase of intravenous contrast administration. Axial 2D reconstructed images and   multiplanar 3D MIP reconstructed images of the head and neck vessels were  performed by the technologist. Dose reduction techniques were used. All stenosis measurements made according to NASCET criteria unless otherwise specified.    FINDINGS:   NONCONTRAST HEAD CT:   INTRACRANIAL CONTENTS: No intracranial hemorrhage, extraaxial collection, or mass effect.  No CT evidence of acute infarct. There is minimal low attenuation within the periventricular and subcortical white matter consistent with minimal vessel ischemia   disease. The ventricular system, basal cisterns and the cortical sulci are consistent with minimal volume loss for age.     VISUALIZED ORBITS/SINUSES/MASTOIDS: No intraorbital abnormality. No paranasal sinus mucosal disease. No middle ear or mastoid effusion.    BONES/SOFT TISSUES: No acute abnormality.    HEAD CTA:  ANTERIOR CIRCULATION: No stenosis/occlusion, aneurysm, or high flow vascular malformation. There is a patent anterior communicating artery.    POSTERIOR CIRCULATION: No stenosis/occlusion, aneurysm, or high flow vascular malformation. Balanced vertebral arteries supply a normal basilar artery.     DURAL VENOUS SINUSES: Expected enhancement of the major dural venous sinuses.    NECK CTA:  RIGHT CAROTID: No measurable stenosis or dissection.    LEFT CAROTID: No measurable stenosis or dissection.    VERTEBRAL ARTERIES: No focal stenosis or dissection. The right vertebral artery is dominant but both vertebral arteries are patent throughout the neck region.    AORTIC ARCH: Classic aortic arch anatomy with no significant stenosis at the origin of the great vessels.    NONVASCULAR STRUCTURES: The lung apices are clear. There are minimal degenerative changes of the cervical spine.      Impression    IMPRESSION:   HEAD CT:  1.  No CT finding of a mass, hemorrhage or focal area suggestive of acute infarct.  2.  Minimal age related changes.    HEAD CTA:   1.  No discrete vessel occlusion, significant stenosis, aneurysm or high flow vascular malformation involving the  arteries of the [place].    NECK CTA:  1.  Normal configuration of the great vessels off the aortic arch with no significant stenosis of their origins.  2.  No significant stenosis or irregularity involving the arteries of the neck criteria.  3.  No radiographic evidence of dissection.   CT Head w/o Contrast    Narrative    EXAM: CT HEAD W/O CONTRAST, CTA HEAD NECK W CONTRAST  LOCATION: [facility]  DATE/TIME: 3/16/2023 1:37 AM    INDICATION: dizziness  COMPARISON: 8/2/2006.  CONTRAST: 75 mL Isovue 370  TECHNIQUE: Head and neck CT angiogram with IV contrast. Noncontrast head CT followed by axial helical CT images of the head and neck vessels obtained during the arterial phase of intravenous contrast administration. Axial 2D reconstructed images and   multiplanar 3D MIP reconstructed images of the head and neck vessels were performed by the technologist. Dose reduction techniques were used. All stenosis measurements made according to NASCET criteria unless otherwise specified.    FINDINGS:   NONCONTRAST HEAD CT:   INTRACRANIAL CONTENTS: No intracranial hemorrhage, extraaxial collection, or mass effect.  No CT evidence of acute infarct. There is minimal low attenuation within the periventricular and subcortical white matter consistent with minimal vessel ischemia   disease. The ventricular system, basal cisterns and the cortical sulci are consistent with minimal volume loss for age.     VISUALIZED ORBITS/SINUSES/MASTOIDS: No intraorbital abnormality. No paranasal sinus mucosal disease. No middle ear or mastoid effusion.    BONES/SOFT TISSUES: No acute abnormality.    HEAD CTA:  ANTERIOR CIRCULATION: No stenosis/occlusion, aneurysm, or high flow vascular malformation. There is a patent anterior communicating artery.    POSTERIOR CIRCULATION: No stenosis/occlusion, aneurysm, or high flow vascular malformation. Balanced vertebral arteries supply a normal basilar artery.     DURAL VENOUS  SINUSES: Expected enhancement of the major dural venous sinuses.    NECK CTA:  RIGHT CAROTID: No measurable stenosis or dissection.    LEFT CAROTID: No measurable stenosis or dissection.    VERTEBRAL ARTERIES: No focal stenosis or dissection. The right vertebral artery is dominant but both vertebral arteries are patent throughout the neck region.    AORTIC ARCH: Classic aortic arch anatomy with no significant stenosis at the origin of the great vessels.    NONVASCULAR STRUCTURES: The lung apices are clear. There are minimal degenerative changes of the cervical spine.      Impression    IMPRESSION:   HEAD CT:  1.  No CT finding of a mass, hemorrhage or focal area suggestive of acute infarct.  2.  Minimal age related changes.    HEAD CTA:   1.  No discrete vessel occlusion, significant stenosis, aneurysm or high flow vascular malformation involving the arteries of the [place].    NECK CTA:  1.  Normal configuration of the great vessels off the aortic arch with no significant stenosis of their origins.  2.  No significant stenosis or irregularity involving the arteries of the neck criteria.  3.  No radiographic evidence of dissection.

## 2023-03-16 NOTE — PHARMACY-ADMISSION MEDICATION HISTORY
Pharmacy Medication History  Admission medication history interview status for the 3/15/2023  admission is complete. See EPIC admission navigator for prior to admission medications     Location of Interview: Patient room  Medication history sources: Patient    Significant changes made to the medication list:    In the past week, patient estimated taking medication this percent of the time: N/A    Medication Affordability:  Not including over the counter (OTC) medications, was there a time in the past 12 months when you did not take your medications as prescribed because of cost?: No    Additional medication history information:     Patient states she does not take any prescription medications, vitamins or supplements    Medication reconciliation completed by provider prior to medication history? Yes    Time spent in this activity: 5 minutes    Prior to Admission medications    Not on File       The information provided in this note is only as accurate as the sources available at the time of update(s)

## 2023-03-17 ENCOUNTER — APPOINTMENT (OUTPATIENT)
Dept: PHYSICAL THERAPY | Facility: CLINIC | Age: 73
End: 2023-03-17
Attending: HOSPITALIST
Payer: COMMERCIAL

## 2023-03-17 LAB
BASOPHILS # BLD AUTO: 0 10E3/UL (ref 0–0.2)
BASOPHILS NFR BLD AUTO: 1 %
EOSINOPHIL # BLD AUTO: 0 10E3/UL (ref 0–0.7)
EOSINOPHIL NFR BLD AUTO: 0 %
ERYTHROCYTE [DISTWIDTH] IN BLOOD BY AUTOMATED COUNT: 11.9 % (ref 10–15)
HCT VFR BLD AUTO: 31 % (ref 35–47)
HGB BLD-MCNC: 9.8 G/DL (ref 11.7–15.7)
IMM GRANULOCYTES # BLD: 0 10E3/UL
IMM GRANULOCYTES NFR BLD: 0 %
LYMPHOCYTES # BLD AUTO: 1.3 10E3/UL (ref 0.8–5.3)
LYMPHOCYTES NFR BLD AUTO: 33 %
MCH RBC QN AUTO: 30.1 PG (ref 26.5–33)
MCHC RBC AUTO-ENTMCNC: 31.6 G/DL (ref 31.5–36.5)
MCV RBC AUTO: 95 FL (ref 78–100)
MONOCYTES # BLD AUTO: 0.3 10E3/UL (ref 0–1.3)
MONOCYTES NFR BLD AUTO: 7 %
NEUTROPHILS # BLD AUTO: 2.3 10E3/UL (ref 1.6–8.3)
NEUTROPHILS NFR BLD AUTO: 59 %
NRBC # BLD AUTO: 0 10E3/UL
NRBC BLD AUTO-RTO: 0 /100
PLATELET # BLD AUTO: 193 10E3/UL (ref 150–450)
RBC # BLD AUTO: 3.26 10E6/UL (ref 3.8–5.2)
WBC # BLD AUTO: 4 10E3/UL (ref 4–11)

## 2023-03-17 PROCEDURE — 85025 COMPLETE CBC W/AUTO DIFF WBC: CPT | Performed by: HOSPITALIST

## 2023-03-17 PROCEDURE — G0378 HOSPITAL OBSERVATION PER HR: HCPCS

## 2023-03-17 PROCEDURE — 258N000003 HC RX IP 258 OP 636: Performed by: HOSPITALIST

## 2023-03-17 PROCEDURE — 36415 COLL VENOUS BLD VENIPUNCTURE: CPT | Performed by: HOSPITALIST

## 2023-03-17 PROCEDURE — 97116 GAIT TRAINING THERAPY: CPT | Mod: GP

## 2023-03-17 PROCEDURE — 99232 SBSQ HOSP IP/OBS MODERATE 35: CPT | Performed by: HOSPITALIST

## 2023-03-17 PROCEDURE — 97161 PT EVAL LOW COMPLEX 20 MIN: CPT | Mod: GP

## 2023-03-17 RX ADMIN — SODIUM CHLORIDE: 9 INJECTION, SOLUTION INTRAVENOUS at 01:48

## 2023-03-17 RX ADMIN — SODIUM CHLORIDE 1000 ML: 9 INJECTION, SOLUTION INTRAVENOUS at 16:11

## 2023-03-17 RX ADMIN — SODIUM CHLORIDE: 9 INJECTION, SOLUTION INTRAVENOUS at 09:53

## 2023-03-17 RX ADMIN — SODIUM CHLORIDE: 9 INJECTION, SOLUTION INTRAVENOUS at 18:14

## 2023-03-17 ASSESSMENT — ACTIVITIES OF DAILY LIVING (ADL)
ADLS_ACUITY_SCORE: 33
ADLS_ACUITY_SCORE: 33
ADLS_ACUITY_SCORE: 34
ADLS_ACUITY_SCORE: 34
ADLS_ACUITY_SCORE: 33
ADLS_ACUITY_SCORE: 34
ADLS_ACUITY_SCORE: 33
ADLS_ACUITY_SCORE: 34
ADLS_ACUITY_SCORE: 34
ADLS_ACUITY_SCORE: 33
ADLS_ACUITY_SCORE: 33
ADLS_ACUITY_SCORE: 37

## 2023-03-17 NOTE — PLAN OF CARE
Occupational Therapy: Orders received. Chart reviewed and discussed with care team.? Occupational Therapy not indicated, spoke with PT, pt with no acute OT needs at this time. Pt completes functional mobility independently.? Defer discharge recommendations to Care Team.? Will complete orders.
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
## 2023-03-17 NOTE — PROGRESS NOTES
Observation goals  PRIOR TO DISCHARGE        Comments:   -diagnostic tests and consults completed and resulted: met   -vital signs normal or at patient baseline: not met   Nurse to notify provider when observation goals have been met and patient is ready for discharge.

## 2023-03-17 NOTE — PROGRESS NOTES
"[facility]    Hospitalist Progress Note    Date of Service (when I saw the patient): 03/17/2023    Assessment & Plan   Mara Rao, 72 year old female with no significant past medical history admitted 3/15/2023 for intermittent spells of dizziness, presyncope, weakness.    Intermittent spells of dizziness, presyncope  Anemia, normocytic  Weakness  Hospital admission for 1 day history of intermittent spells, dizziness, and presyncope.  Otherwise healthy per patient report with no significant past medical history.  Initial stroke work-up in ER on admission negative.  Reportedly diagnosed with \"polycythemia\" prior to admission with 1 unit phlebotomy per patient 2 days prior to admission.  Initial stroke work-up on admission with negative head CT and head/neck CTA.  TTE unremarkable.  Ruled out for ACS.  Thought to be due to phlebotomy. Plan is hydration before phlebotomy and reduction of phleb volume per hemonc.  Neg orthostatics this am.  Seen by hemonc.  Dizziness still present but improved.  - PT/OT.  - fall precautions.      Diet: Regular Diet Adult    DVT Prophylaxis: Pneumatic Compression Devices  Martinez Catheter: Not present  Lines: None     Cardiac Monitoring: ACTIVE order. Indication: Syncope- low cardiac risk (24 hours)  Code Status: No CPR- Do NOT Intubate      Disp: pending PT/OT (this afternoon or in am).    Lee Ann Garcia MD    Interval History   Dizzy sells still present but improved. Denies cp/sob/palpitations. No abd pain/n/v.      Physical Exam   Temp: 98.1  F (36.7  C) Temp src: Oral BP: 92/43 Pulse: 74   Resp: 16 SpO2: 99 % O2 Device: None (Room air)    Vitals:    03/15/23 2044   Weight: 40.8 kg (90 lb)     Vital Signs with Ranges  Temp:  [98  F (36.7  C)-98.7  F (37.1  C)] 98.1  F (36.7  C)  Pulse:  [69-89] 74  Resp:  [14-30] 16  BP: ()/(43-70) 92/43  SpO2:  [98 %-100 %] 99 %  No intake/output data recorded.    Respiratory: CTAB  Cardiovascular: RRR  GI: " positive BS, no organomegaly.  Skin/Integumen: no edema.  Other:      Medications     sodium chloride 125 mL/hr at 23 0148         Data   Recent Labs   Lab 23  0640 23  1106 23  0703 03/15/23  2049   WBC 4.0 3.9*  --  4.8   HGB 9.8* 10.0* 9.6* 11.3*   MCV 95 92  --  92    208  --  274   NA  --   --   --  139   POTASSIUM  --   --   --  4.1   CHLORIDE  --   --   --  105   CO2  --   --   --  25   BUN  --   --   --  19.1   CR  --   --   --  0.67   ANIONGAP  --   --   --  9   GIUSEPPE  --   --   --  9.1   GLC  --   --   --  107*   ALBUMIN  --   --   --  3.8   PROTTOTAL  --   --   --  6.5   BILITOTAL  --  0.4  --  0.2   ALKPHOS  --   --   --  69   ALT  --   --   --  11   AST  --   --   --  19       Recent Results (from the past 24 hour(s))   Echocardiogram Complete   Result Value    LVEF  60-65%    Narrative    [id number]  [id number]  [id number]  [id number]^AMARJIT^SHAGGY     [facility]  Echocardiography Laboratory  [address] [place] [id number]     Name: MARIO HOANG  MRN: [id number]  : 1950  Study Date: 2023 10:07 AM  Age: 72 yrs  Gender: Female  Patient Location: [facility]  Reason For Study: Dizziness  Ordering Physician: AMARJIT RAYMOND  Performed By: Lissy Arenas RDCS     BSA: 1.3 m2  Height: 60 in  Weight: 90 lb  HR: 82  BP: 100/50 mmHg  ______________________________________________________________________________  Procedure  Complete Portable Echo Adult.  ______________________________________________________________________________  Interpretation Summary     1. Normal biventricular size and function. Left ventricular ejection fraction  of 60-65%.  2. No segmental wall motion abnormalities noted.  3. No hemodynamically significant valvular disease.  No prior study for comparison.  ______________________________________________________________________________  Left Ventricle  The left ventricle is normal in size. There is normal left  ventricular wall  thickness. Left ventricular systolic function is normal. The visual ejection  fraction is 60-65%. Left ventricular diastolic function is normal. No regional  wall motion abnormalities noted.     Right Ventricle  The right ventricle is normal in size and function.     Atria  Normal left atrial size. Right atrial size is normal.     Mitral Valve  The mitral valve leaflets appear normal. There is no evidence of stenosis,  fluttering, or prolapse. There is trace mitral regurgitation.     Tricuspid Valve  Normal tricuspid valve. There is trace tricuspid regurgitation.     Aortic Valve  The aortic valve is not well visualized. There is trace aortic regurgitation.     Pulmonic Valve  The pulmonic valve is not well visualized.     Vessels  Normal size aorta. Normal ascending, transverse (arch), and descending aorta.  IVC diameter <2.1 cm collapsing >50% with sniff suggests a normal RA pressure  of 3 mmHg.     Pericardium  There is no pericardial effusion.     Rhythm  Sinus rhythm was noted.  ______________________________________________________________________________  MMode/2D Measurements & Calculations     IVSd: 0.58 cm  LVIDd: 3.7 cm  LVIDs: 2.5 cm  LVPWd: 0.62 cm  FS: 32.2 %  LV mass(C)d: 57.6 grams  LV mass(C)dI: 43.3 grams/m2  Ao root diam: 2.7 cm  LA dimension: 2.3 cm  LA/Ao: 0.85  RWT: 0.33     Doppler Measurements & Calculations  MV E max jose miguel: 101.8 cm/sec  MV A max jose miguel: 85.5 cm/sec  MV E/A: 1.2  MV dec time: 0.17 sec  PA acc time: 0.12 sec  TR max jose miguel: 241.3 cm/sec  TR max P.3 mmHg  E/E' avg: 10.0  Lateral E/e': 9.5  Medial E/e': 10.5     ______________________________________________________________________________  Report approved by: Marilyn Carlisle 2023 01:39 PM

## 2023-03-17 NOTE — PROGRESS NOTES
03/17/23 1019   Appointment Info   Signing Clinician's Name / Credentials (PT) Fidelia Jones, PT, DPT   Quick Adds   Quick Adds Certification   Living Environment   People in Home alone   Current Living Arrangements house   Home Accessibility stairs to enter home;stairs within home   Number of Stairs, Main Entrance 6   Stair Railings, Main Entrance railings safe and in good condition   Number of Stairs, Within Home, Primary ten   Stair Railings, Within Home, Primary railings safe and in good condition   Transportation Anticipated car, drives self   Self-Care   Usual Activity Tolerance excellent   Regular Exercise Yes   Activity/Exercise Type walking  (yoga, meditation)   Exercise Amount/Frequency daily   Equipment Currently Used at Home none   Fall history within last six months no   Activity/Exercise/Self-Care Comment Independent at baseline without device. Likes to keep busy. has friend support in community. Pt is retired nurse.   General Information   Onset of Illness/Injury or Date of Surgery 03/15/23   Referring Physician Joseph Pena MD   Patient/Family Therapy Goals Statement (PT) to go home   Pertinent History of Current Problem (include personal factors and/or comorbidities that impact the POC) Per chart: Mara Rao is a 72 year old female with a recent diagnosis of polycythemia presents to hospital with multiple near syncopal episodes   Cognition   Affect/Mental Status (Cognition) WNL   Pain Assessment   Patient Currently in Pain No   Integumentary/Edema   Integumentary/Edema no deficits were identifed   Posture    Posture Not impaired   Range of Motion (ROM)   Range of Motion ROM is WNL   Strength (Manual Muscle Testing)   Strength (Manual Muscle Testing) strength is WNL   Bed Mobility   Bed Mobility no deficits identified   Transfers   Transfers no deficits identified   Comment, (Transfers) no device   Gait/Stairs (Locomotion)   [place] Level (Gait) supervision   Distance in Feet  100ft   Distance in Feet (Gait) 400ft   Pattern (Gait) step-through   Comment, (Gait/Stairs) steady without device, slow pace at first   Balance   Balance Comments 29/30 on FGA, one point d/t light use of railing on stairs. Otherwise balance WNL, no concerns.   Sensory Examination   Sensory Perception patient reports no sensory changes   Sensory Perception Comments Vestib and vision screen negative   Coordination   Coordination no deficits were identified   Clinical Impression   Criteria for Skilled Therapeutic Intervention Yes, treatment indicated   PT Diagnosis (PT) decreased activity tolerance   Influenced by the following impairments activity tolerance   Functional limitations due to impairments ambulation, stairs   Clinical Presentation (PT Evaluation Complexity) Stable/Uncomplicated   Clinical Presentation Rationale clinical judgement   Clinical Decision Making (Complexity) low complexity   Planned Therapy Interventions (PT) gait training   Risk & Benefits of therapy have been explained evaluation/treatment results reviewed;care plan/treatment goals reviewed;risks/benefits reviewed;current/potential barriers reviewed;participants voiced agreement with care plan;participants included;patient   PT Total Evaluation Time   PT Eval, Low Complexity Minutes (81548) 10   Therapy Certification   Start of care date 03/17/23   Certification date from 03/17/23   Certification date to 03/17/23   Medical Diagnosis presyncope   Physical Therapy Goals   PT Frequency One time eval and treatment only   PT Predicted Duration/Target Date for Goal Attainment 03/17/23   PT Goals Bed Mobility;Transfers;Gait;Stairs   PT: Bed Mobility Independent;Goal Met   PT: Transfers Independent;Goal Met   PT: Gait Independent;Greater than 200 feet   PT: Stairs Modified independent;10 stairs   Interventions   Interventions Quick Adds Gait Training   Gait Training   Gait Training Minutes (45900) 10   Symptoms Noted During/After Treatment (Gait  Training) none   Treatment Detail/Skilled Intervention Ambulation w/o device, steady throughout w/ dual tasks and good/fast pace. Stairs x 12 w/ light use of one railing. 29/30 on FGA   [place] Level (Gait Training) independent   PT Discharge Planning   PT Plan goals met, d/c   PT Discharge Recommendation (DC Rec) home   PT Rationale for DC Rec Patient appears to be close/at baseline, mobilizing well in long hallway distances without device. may have decreased activity tolerance compared to baseline where she is normally very active, anticipated to improve over time without skilled PT.   PT Brief overview of current status independent   Total Session Time   Timed Code Treatment Minutes 10   Total Session Time (sum of timed and untimed services) 20         [facility]  OUTPATIENT PHYSICAL THERAPY EVALUATION  PLAN OF TREATMENT FOR OUTPATIENT REHABILITATION  (COMPLETE FOR INITIAL CLAIMS ONLY)  Patient's Last Name, First Name, M.I.  YOB: 1950  Mara Rao                        Provider's Name  [facility] Medical Record No.  [id number]                             Onset Date:  03/15/23   Start of Care Date:  03/17/23   Type:     _X_PT   ___OT   ___SLP Medical Diagnosis:  presyncope              PT Diagnosis:  decreased activity tolerance Visits from SOC:  1     See note for plan of treatment, functional goals and certification details    I CERTIFY THE NEED FOR THESE SERVICES FURNISHED UNDER        THIS PLAN OF TREATMENT AND WHILE UNDER MY CARE     (Physician co-signature of this document indicates review and certification of the therapy plan).

## 2023-03-17 NOTE — PLAN OF CARE
"Orientation/Cognitive: A&Ox4  Observation Goals (Met/ Not Met): not met  Mobility Level/Assist Equipment: Independent   Fall Risk (Y/N): no  Behavior Concerns: none  Pain Management: denies pain  Tele/VS/O2: hypotensive at times-Normal Saline bolus given, orthostatic BP negative, all other VSS on RA  ABNL Lab/BG: hemoglobin 9.8  Diet: regular   Bowel/Bladder: continent  Skin Concerns: none  Drains/Devices: IVF @125mL/hr  Tests/Procedures for next shift: none  Anticipated DC date & active delays: TBD  Patient Stated Goal for Today: \"rest\"      "

## 2023-03-17 NOTE — PROGRESS NOTES
Oncology chart check for Dr Solis:    Please see Dr Solis's recommendations from 3/16/2023.  No new recommendations today.  Will arrange outpatient follow up with Dr. Solis.

## 2023-03-17 NOTE — PLAN OF CARE
Physical Therapy Discharge Summary    Reason for therapy discharge:    All goals and outcomes met, no further needs identified.    Progress towards therapy goal(s). See goals on Care Plan in [facility] electronic health record for goal details.  Goals met    Therapy recommendation(s):    No further therapy is recommended. Pt independent.

## 2023-03-17 NOTE — PROVIDER NOTIFICATION
MD Notification    Notified Person: MD    Notified Person Name: Dr. Garcia     Notification Date/Time: 3/17/23 @1331    Notification Interaction: [employer]     Purpose of Notification: FYI BP is 90/39, she is not symptomatic. She does have NS running at 125mL/hr    Orders Received: Normal saline bolus over 2 hours, continue Normal saline infusion at 125mL/hr afterwards.     Comments: BP rechecked and was 102/55  repage at 1600 to Dr. Garcia, hypotensive at 88/41 HR 84

## 2023-03-18 VITALS
HEIGHT: 60 IN | OXYGEN SATURATION: 98 % | BODY MASS INDEX: 17.67 KG/M2 | DIASTOLIC BLOOD PRESSURE: 47 MMHG | SYSTOLIC BLOOD PRESSURE: 92 MMHG | WEIGHT: 90 LBS | TEMPERATURE: 98.5 F | RESPIRATION RATE: 16 BRPM | HEART RATE: 69 BPM

## 2023-03-18 PROCEDURE — 99239 HOSP IP/OBS DSCHRG MGMT >30: CPT | Performed by: INTERNAL MEDICINE

## 2023-03-18 PROCEDURE — 96361 HYDRATE IV INFUSION ADD-ON: CPT

## 2023-03-18 PROCEDURE — 258N000003 HC RX IP 258 OP 636: Performed by: HOSPITALIST

## 2023-03-18 PROCEDURE — G0378 HOSPITAL OBSERVATION PER HR: HCPCS

## 2023-03-18 RX ADMIN — SODIUM CHLORIDE: 9 INJECTION, SOLUTION INTRAVENOUS at 02:11

## 2023-03-18 RX ADMIN — SODIUM CHLORIDE: 9 INJECTION, SOLUTION INTRAVENOUS at 09:57

## 2023-03-18 ASSESSMENT — ACTIVITIES OF DAILY LIVING (ADL)
ADLS_ACUITY_SCORE: 33
ADLS_ACUITY_SCORE: 37
ADLS_ACUITY_SCORE: 33
ADLS_ACUITY_SCORE: 33

## 2023-03-18 NOTE — PROGRESS NOTES
Observation goals  PRIOR TO DISCHARGE       Comments:   -diagnostic tests and consults completed and resulted: met   -vital signs normal or at patient baseline: Partially met   Nurse to notify provider when observation goals have been met and patient is ready for discharge.

## 2023-03-18 NOTE — DISCHARGE SUMMARY
"[facility]  Hospitalist Discharge Summary      Date of Admission:  3/15/2023  Date of Discharge:  3/18/2023  Discharging Provider: Javier Cobb DO  Discharge Service: Hospitalist Service    Discharge Diagnoses     Near syncope, likely orthostatic from phlebotomy   Polycythemia.  Subsequent anemia due to phlebotomy        Follow-ups Needed After Discharge   Follow-up Appointments     Follow-up and recommended labs and tests       Follow up with primary care provider, Marilyn Chappell, within 2-4 weeks,   for hospital follow- up. The following labs/tests are recommended: CBC  Follow up with hem/onc as recommended           Unresulted Labs Ordered in the Past 30 Days of this Admission     No orders found from 2/13/2023 to 3/16/2023.        Discharge Disposition   Discharged to home  Condition at discharge: Stable    Hospital Course   Mara Rao, 72 year old female with no significant past medical history admitted 3/15/2023 for intermittent spells of dizziness, presyncope, weakness.     Near syncope, likely orthostatic from phlebotomy   Polycythemia.  Subsequent anemia due to phlebotomy   Hospital admission for 1 day history of intermittent spells, dizziness, and presyncope.  Otherwise healthy per patient report with no significant past medical history.  Initial stroke work-up in ER on admission negative.  Reportedly diagnosed with \"polycythemia\" prior to admission with 1 unit phlebotomy per patient 2 days prior to admission.  Initial stroke work-up on admission with negative head CT and head/neck CTA.  TTE unremarkable.  Ruled out for ACS.  Thought to be due to phlebotomy. Patient was hydrated and orthostatic hypotension resolved   Seen by hem/onc.  See their note for recommendations          Consultations This Hospital Stay   PHYSICAL THERAPY ADULT IP CONSULT  HEMATOLOGY & ONCOLOGY IP CONSULT  HEMATOLOGY & ONCOLOGY IP CONSULT  OCCUPATIONAL THERAPY ADULT IP CONSULT    Code Status "   No CPR- Do NOT Intubate    Time Spent on this Encounter   I, Javier Cobb DO, personally saw the patient today and spent greater than 30 minutes discharging this patient.       Javier Cobb DO  [facility] EXTENDED RECOVERY AND SHORT STAY  [address] [address]  Phone: [phone]  ______________________________________________________________________    Physical Exam   Vital Signs: Temp: 98.5  F (36.9  C) Temp src: Oral BP: 92/47 Pulse: 69   Resp: 16 SpO2: 98 % O2 Device: None (Room air)    Weight: 90 lbs 0 oz  General Appearance: Resting comfortably. NAD   Respiratory: Clear to auscultation.  No respiratory distress  Cardiovascular: RRR.  No obvious murmurs  GI: Soft.  Non-distended  Skin: No obvious rashes or cyanosis  Other: Alert.  No edema         Primary Care Physician   Marilyn Chappell    Discharge Orders      Reason for your hospital stay    Orthostatic hypotension (drop in blood pressure) due to recent blood loss from phlebotomy     Follow-up and recommended labs and tests     Follow up with primary care provider, Marilyn Chappell, within 2-4 weeks, for hospital follow- up. The following labs/tests are recommended: CBC  Follow up with hem/onc as recommended     Activity    Your activity upon discharge: activity as tolerated     Diet    Follow this diet upon discharge: Orders Placed This Encounter      Regular Diet Adult       Significant Results and Procedures   Most Recent 3 CBC's:Recent Labs   Lab Test 03/17/23  0640 03/16/23  1106 03/16/23  0703 03/15/23  2049   WBC 4.0 3.9*  --  4.8   HGB 9.8* 10.0* 9.6* 11.3*   MCV 95 92  --  92    208  --  274     Most Recent 3 BMP's:Recent Labs   Lab Test 03/15/23  2049      POTASSIUM 4.1   CHLORIDE 105   CO2 25   BUN 19.1   CR 0.67   ANIONGAP 9   GIUSEPPE 9.1   *   ,   Results for orders placed or performed during the hospital encounter of 03/15/23   CT Head w/o Contrast    Narrative    EXAM: CT  HEAD W/O CONTRAST, CTA HEAD NECK W CONTRAST  LOCATION: [facility]  DATE/TIME: 3/16/2023 1:37 AM    INDICATION: dizziness  COMPARISON: 8/2/2006.  CONTRAST: 75 mL Isovue 370  TECHNIQUE: Head and neck CT angiogram with IV contrast. Noncontrast head CT followed by axial helical CT images of the head and neck vessels obtained during the arterial phase of intravenous contrast administration. Axial 2D reconstructed images and   multiplanar 3D MIP reconstructed images of the head and neck vessels were performed by the technologist. Dose reduction techniques were used. All stenosis measurements made according to NASCET criteria unless otherwise specified.    FINDINGS:   NONCONTRAST HEAD CT:   INTRACRANIAL CONTENTS: No intracranial hemorrhage, extraaxial collection, or mass effect.  No CT evidence of acute infarct. There is minimal low attenuation within the periventricular and subcortical white matter consistent with minimal vessel ischemia   disease. The ventricular system, basal cisterns and the cortical sulci are consistent with minimal volume loss for age.     VISUALIZED ORBITS/SINUSES/MASTOIDS: No intraorbital abnormality. No paranasal sinus mucosal disease. No middle ear or mastoid effusion.    BONES/SOFT TISSUES: No acute abnormality.    HEAD CTA:  ANTERIOR CIRCULATION: No stenosis/occlusion, aneurysm, or high flow vascular malformation. There is a patent anterior communicating artery.    POSTERIOR CIRCULATION: No stenosis/occlusion, aneurysm, or high flow vascular malformation. Balanced vertebral arteries supply a normal basilar artery.     DURAL VENOUS SINUSES: Expected enhancement of the major dural venous sinuses.    NECK CTA:  RIGHT CAROTID: No measurable stenosis or dissection.    LEFT CAROTID: No measurable stenosis or dissection.    VERTEBRAL ARTERIES: No focal stenosis or dissection. The right vertebral artery is dominant but both vertebral arteries are patent throughout the neck  region.    AORTIC ARCH: Classic aortic arch anatomy with no significant stenosis at the origin of the great vessels.    NONVASCULAR STRUCTURES: The lung apices are clear. There are minimal degenerative changes of the cervical spine.      Impression    IMPRESSION:   HEAD CT:  1.  No CT finding of a mass, hemorrhage or focal area suggestive of acute infarct.  2.  Minimal age related changes.    HEAD CTA:   1.  No discrete vessel occlusion, significant stenosis, aneurysm or high flow vascular malformation involving the arteries of the [place].    NECK CTA:  1.  Normal configuration of the great vessels off the aortic arch with no significant stenosis of their origins.  2.  No significant stenosis or irregularity involving the arteries of the neck criteria.  3.  No radiographic evidence of dissection.   CTA Head Neck with Contrast    Narrative    EXAM: CT HEAD W/O CONTRAST, CTA HEAD NECK W CONTRAST  LOCATION: [facility]  DATE/TIME: 3/16/2023 1:37 AM    INDICATION: dizziness  COMPARISON: 8/2/2006.  CONTRAST: 75 mL Isovue 370  TECHNIQUE: Head and neck CT angiogram with IV contrast. Noncontrast head CT followed by axial helical CT images of the head and neck vessels obtained during the arterial phase of intravenous contrast administration. Axial 2D reconstructed images and   multiplanar 3D MIP reconstructed images of the head and neck vessels were performed by the technologist. Dose reduction techniques were used. All stenosis measurements made according to NASCET criteria unless otherwise specified.    FINDINGS:   NONCONTRAST HEAD CT:   INTRACRANIAL CONTENTS: No intracranial hemorrhage, extraaxial collection, or mass effect.  No CT evidence of acute infarct. There is minimal low attenuation within the periventricular and subcortical white matter consistent with minimal vessel ischemia   disease. The ventricular system, basal cisterns and the cortical sulci are consistent with minimal  volume loss for age.     VISUALIZED ORBITS/SINUSES/MASTOIDS: No intraorbital abnormality. No paranasal sinus mucosal disease. No middle ear or mastoid effusion.    BONES/SOFT TISSUES: No acute abnormality.    HEAD CTA:  ANTERIOR CIRCULATION: No stenosis/occlusion, aneurysm, or high flow vascular malformation. There is a patent anterior communicating artery.    POSTERIOR CIRCULATION: No stenosis/occlusion, aneurysm, or high flow vascular malformation. Balanced vertebral arteries supply a normal basilar artery.     DURAL VENOUS SINUSES: Expected enhancement of the major dural venous sinuses.    NECK CTA:  RIGHT CAROTID: No measurable stenosis or dissection.    LEFT CAROTID: No measurable stenosis or dissection.    VERTEBRAL ARTERIES: No focal stenosis or dissection. The right vertebral artery is dominant but both vertebral arteries are patent throughout the neck region.    AORTIC ARCH: Classic aortic arch anatomy with no significant stenosis at the origin of the great vessels.    NONVASCULAR STRUCTURES: The lung apices are clear. There are minimal degenerative changes of the cervical spine.      Impression    IMPRESSION:   HEAD CT:  1.  No CT finding of a mass, hemorrhage or focal area suggestive of acute infarct.  2.  Minimal age related changes.    HEAD CTA:   1.  No discrete vessel occlusion, significant stenosis, aneurysm or high flow vascular malformation involving the arteries of the [place].    NECK CTA:  1.  Normal configuration of the great vessels off the aortic arch with no significant stenosis of their origins.  2.  No significant stenosis or irregularity involving the arteries of the neck criteria.  3.  No radiographic evidence of dissection.   Chest XR,  PA & LAT    Narrative    EXAM: XR CHEST 2 VIEWS  LOCATION: [facility]  DATE/TIME: 3/16/2023 1:35 AM    INDICATION: Weakness, SOB  COMPARISON: None.      Impression    IMPRESSION: Mild scarring at the apices of the upper  lobes. No acute lung consolidation, pleural effusion or pneumothorax. Normal heart size and pulmonary vascularity.   Echocardiogram Complete     Value    LVEF  60-65%    [facility]    [id number]  [id number]  [id number]  ^SEGUNDO^AMARJIT^SHAGGY     [facility]  Echocardiography Laboratory  [address] [place] [address]     Name: MARIO HOANG  MRN: [id number]  : 1950  Study Date: 2023 10:07 AM  Age: 72 yrs  Gender: Female  Patient Location: [facility]  Reason For Study: Dizziness  Ordering Physician: AMARJIT RAYMOND  Performed By: Lissy Arensa RDCS     BSA: 1.3 m2  Height: 60 in  Weight: 90 lb  HR: 82  BP: 100/50 mmHg  ______________________________________________________________________________  Procedure  Complete Portable Echo Adult.  ______________________________________________________________________________  Interpretation Summary     1. Normal biventricular size and function. Left ventricular ejection fraction  of 60-65%.  2. No segmental wall motion abnormalities noted.  3. No hemodynamically significant valvular disease.  No prior study for comparison.  ______________________________________________________________________________  Left Ventricle  The left ventricle is normal in size. There is normal left ventricular wall  thickness. Left ventricular systolic function is normal. The visual ejection  fraction is 60-65%. Left ventricular diastolic function is normal. No regional  wall motion abnormalities noted.     Right Ventricle  The right ventricle is normal in size and function.     Atria  Normal left atrial size. Right atrial size is normal.     Mitral Valve  The mitral valve leaflets appear normal. There is no evidence of stenosis,  fluttering, or prolapse. There is trace mitral regurgitation.     Tricuspid Valve  Normal tricuspid valve. There is trace tricuspid regurgitation.     Aortic Valve  The aortic valve is not well visualized. There is trace aortic  regurgitation.     Pulmonic Valve  The pulmonic valve is not well visualized.     Vessels  Normal size aorta. Normal ascending, transverse (arch), and descending aorta.  IVC diameter <2.1 cm collapsing >50% with sniff suggests a normal RA pressure  of 3 mmHg.     Pericardium  There is no pericardial effusion.     Rhythm  Sinus rhythm was noted.  ______________________________________________________________________________  MMode/2D Measurements & Calculations     IVSd: 0.58 cm  LVIDd: 3.7 cm  LVIDs: 2.5 cm  LVPWd: 0.62 cm  FS: 32.2 %  LV mass(C)d: 57.6 grams  LV mass(C)dI: 43.3 grams/m2  Ao root diam: 2.7 cm  LA dimension: 2.3 cm  LA/Ao: 0.85  RWT: 0.33     Doppler Measurements & Calculations  MV E max jose miguel: 101.8 cm/sec  MV A max jose miguel: 85.5 cm/sec  MV E/A: 1.2  MV dec time: 0.17 sec  PA acc time: 0.12 sec  TR max jose miguel: 241.3 cm/sec  TR max P.3 mmHg  E/E' avg: 10.0  Lateral E/e': 9.5  Medial E/e': 10.5     ______________________________________________________________________________  Report approved by: Marilyn Carlisle 2023 01:39 PM               Discharge Medications   There are no discharge medications for this patient.    Allergies   No Known Allergies

## 2023-03-18 NOTE — PLAN OF CARE
Goal Outcome Evaluation:      Plan of Care Reviewed With: patient    Orientation/Cognitive: AOx4  Observation Goals (Met/ Not Met): Met  Mobility Level/Assist Equipment: IND  Fall Risk (Y/N): N  Behavior Concerns: Green  Pain Management: Denies  Tele/VS/O2: VSS on RA  ABNL Lab/BG: Hgb 9.8; Iron 209  Diet: Regular  Bowel/Bladder: Continent  Skin Concerns: None  Drains/Devices: PIV removed  Tests/Procedures for next shift: Follow up outpatient hem/onc  Anticipated DC date & active delays: Today at 2:30 pm, friends to pickup at door 2  Patient Stated Goal for Today: Discharge to home   AVS reviewed with patient, all questions answered and belongings gather. Patient discharged off the floor.

## 2023-03-18 NOTE — PLAN OF CARE
Goal Outcome Evaluation:  Orientation/Cognitive: A&O X4  Observation Goals (Met/ Not Met): Partially met   Mobility Level/Assist Equipment: Independent   Fall Risk (Y/N): N  Behavior Concerns: None   Pain Management: Denies pain   Tele/VS/O2: VSS on RA except soft BP  ABNL Lab/BG: hemoglobin 9.8  Diet: Regular   Bowel/Bladder: Continent   Skin Concerns: None   Drains/Devices: IVF NS @125 ml/hr   Tests/Procedures for next shift: None   Anticipated DC date & active delays: 3/18  Patient Stated Goal for Today: Rest         Observation goals  PRIOR TO DISCHARGE       Comments:   -diagnostic tests and consults completed and resulted: met   -vital signs normal or at patient baseline: Partially met   Nurse to notify provider when observation goals have been met and patient is ready for discharge.

## 2023-03-18 NOTE — PROGRESS NOTES
Hematology Chart Check:    Please see Dr Solis's recommendations from 3/16/2023.  No new recommendations today.  Will arrange outpatient follow up with Dr. Solis.      Reuben Latham M.D.  [facility]  [phone]

## 2025-06-19 ENCOUNTER — APPOINTMENT (OUTPATIENT)
Dept: CT IMAGING | Facility: CLINIC | Age: 75
End: 2025-06-19
Attending: EMERGENCY MEDICINE
Payer: COMMERCIAL

## 2025-06-19 ENCOUNTER — APPOINTMENT (OUTPATIENT)
Dept: MRI IMAGING | Facility: CLINIC | Age: 75
End: 2025-06-19
Attending: EMERGENCY MEDICINE
Payer: COMMERCIAL

## 2025-06-19 ENCOUNTER — HOSPITAL ENCOUNTER (INPATIENT)
Facility: CLINIC | Age: 75
End: 2025-06-19
Attending: EMERGENCY MEDICINE | Admitting: HOSPITALIST
Payer: COMMERCIAL

## 2025-06-19 VITALS
SYSTOLIC BLOOD PRESSURE: 106 MMHG | OXYGEN SATURATION: 99 % | HEART RATE: 76 BPM | TEMPERATURE: 97.3 F | RESPIRATION RATE: 15 BRPM | DIASTOLIC BLOOD PRESSURE: 51 MMHG

## 2025-06-19 DIAGNOSIS — Z71.89 OTHER SPECIFIED COUNSELING: Chronic | ICD-10-CM

## 2025-06-19 DIAGNOSIS — I63.9 ISCHEMIC STROKE (H): ICD-10-CM

## 2025-06-19 LAB
ALBUMIN SERPL BCG-MCNC: 4.2 G/DL (ref 3.5–5.2)
ALP SERPL-CCNC: 68 U/L (ref 40–150)
ALT SERPL W P-5'-P-CCNC: 9 U/L (ref 0–50)
ANION GAP SERPL CALCULATED.3IONS-SCNC: 13 MMOL/L (ref 7–15)
AST SERPL W P-5'-P-CCNC: 20 U/L (ref 0–45)
ATRIAL RATE - MUSE: 76 BPM
BASOPHILS # BLD AUTO: 0 10E3/UL (ref 0–0.2)
BASOPHILS NFR BLD AUTO: 1 %
BILIRUB SERPL-MCNC: 0.6 MG/DL
BUN SERPL-MCNC: 13.9 MG/DL (ref 8–23)
CALCIUM SERPL-MCNC: 9.1 MG/DL (ref 8.8–10.4)
CHLORIDE SERPL-SCNC: 107 MMOL/L (ref 98–107)
CREAT SERPL-MCNC: 0.75 MG/DL (ref 0.51–0.95)
DIASTOLIC BLOOD PRESSURE - MUSE: NORMAL MMHG
EGFRCR SERPLBLD CKD-EPI 2021: 84 ML/MIN/1.73M2
EOSINOPHIL # BLD AUTO: 0 10E3/UL (ref 0–0.7)
EOSINOPHIL NFR BLD AUTO: 0 %
ERYTHROCYTE [DISTWIDTH] IN BLOOD BY AUTOMATED COUNT: 15.9 % (ref 10–15)
GLUCOSE SERPL-MCNC: 98 MG/DL (ref 70–99)
HCO3 SERPL-SCNC: 21 MMOL/L (ref 22–29)
HCT VFR BLD AUTO: 34.8 % (ref 35–47)
HGB BLD-MCNC: 10.6 G/DL (ref 11.7–15.7)
HOLD SPECIMEN: NORMAL
HOLD SPECIMEN: NORMAL
IMM GRANULOCYTES # BLD: 0 10E3/UL
IMM GRANULOCYTES NFR BLD: 0 %
INTERPRETATION ECG - MUSE: NORMAL
IRON BINDING CAPACITY (ROCHE): 309 UG/DL (ref 240–430)
IRON SATN MFR SERPL: 8 % (ref 15–46)
IRON SERPL-MCNC: 25 UG/DL (ref 37–145)
LYMPHOCYTES # BLD AUTO: 1 10E3/UL (ref 0.8–5.3)
LYMPHOCYTES NFR BLD AUTO: 21 %
MCH RBC QN AUTO: 23.4 PG (ref 26.5–33)
MCHC RBC AUTO-ENTMCNC: 30.5 G/DL (ref 31.5–36.5)
MCV RBC AUTO: 77 FL (ref 78–100)
MONOCYTES # BLD AUTO: 0.4 10E3/UL (ref 0–1.3)
MONOCYTES NFR BLD AUTO: 8 %
NEUTROPHILS # BLD AUTO: 3.5 10E3/UL (ref 1.6–8.3)
NEUTROPHILS NFR BLD AUTO: 71 %
NRBC # BLD AUTO: 0 10E3/UL
NRBC BLD AUTO-RTO: 0 /100
P AXIS - MUSE: 81 DEGREES
PLATELET # BLD AUTO: 228 10E3/UL (ref 150–450)
POTASSIUM SERPL-SCNC: 4.5 MMOL/L (ref 3.4–5.3)
PR INTERVAL - MUSE: 126 MS
PROT SERPL-MCNC: 7 G/DL (ref 6.4–8.3)
QRS DURATION - MUSE: 88 MS
QT - MUSE: 388 MS
QTC - MUSE: 436 MS
R AXIS - MUSE: 69 DEGREES
RBC # BLD AUTO: 4.53 10E6/UL (ref 3.8–5.2)
SODIUM SERPL-SCNC: 141 MMOL/L (ref 135–145)
SYSTOLIC BLOOD PRESSURE - MUSE: NORMAL MMHG
T AXIS - MUSE: 61 DEGREES
TROPONIN T SERPL HS-MCNC: <6 NG/L
VENTRICULAR RATE- MUSE: 76 BPM
WBC # BLD AUTO: 4.9 10E3/UL (ref 4–11)

## 2025-06-19 PROCEDURE — 80061 LIPID PANEL: CPT | Performed by: HOSPITALIST

## 2025-06-19 PROCEDURE — 255N000002 HC RX 255 OP 636: Performed by: EMERGENCY MEDICINE

## 2025-06-19 PROCEDURE — 82607 VITAMIN B-12: CPT | Performed by: HOSPITALIST

## 2025-06-19 PROCEDURE — 99222 1ST HOSP IP/OBS MODERATE 55: CPT | Performed by: HOSPITALIST

## 2025-06-19 PROCEDURE — 85004 AUTOMATED DIFF WBC COUNT: CPT | Performed by: EMERGENCY MEDICINE

## 2025-06-19 PROCEDURE — 70450 CT HEAD/BRAIN W/O DYE: CPT

## 2025-06-19 PROCEDURE — 36415 COLL VENOUS BLD VENIPUNCTURE: CPT | Performed by: EMERGENCY MEDICINE

## 2025-06-19 PROCEDURE — 83036 HEMOGLOBIN GLYCOSYLATED A1C: CPT | Performed by: HOSPITALIST

## 2025-06-19 PROCEDURE — 70549 MR ANGIOGRAPH NECK W/O&W/DYE: CPT

## 2025-06-19 PROCEDURE — 80053 COMPREHEN METABOLIC PANEL: CPT | Performed by: EMERGENCY MEDICINE

## 2025-06-19 PROCEDURE — 120N000001 HC R&B MED SURG/OB

## 2025-06-19 PROCEDURE — 70544 MR ANGIOGRAPHY HEAD W/O DYE: CPT

## 2025-06-19 PROCEDURE — 85025 COMPLETE CBC W/AUTO DIFF WBC: CPT | Performed by: EMERGENCY MEDICINE

## 2025-06-19 PROCEDURE — 70553 MRI BRAIN STEM W/O & W/DYE: CPT

## 2025-06-19 PROCEDURE — 84075 ASSAY ALKALINE PHOSPHATASE: CPT | Performed by: EMERGENCY MEDICINE

## 2025-06-19 PROCEDURE — 36415 COLL VENOUS BLD VENIPUNCTURE: CPT | Performed by: HOSPITALIST

## 2025-06-19 PROCEDURE — 83550 IRON BINDING TEST: CPT | Performed by: HOSPITALIST

## 2025-06-19 PROCEDURE — A9585 GADOBUTROL INJECTION: HCPCS | Performed by: EMERGENCY MEDICINE

## 2025-06-19 PROCEDURE — 84484 ASSAY OF TROPONIN QUANT: CPT | Performed by: HOSPITALIST

## 2025-06-19 PROCEDURE — 99285 EMERGENCY DEPT VISIT HI MDM: CPT | Mod: 25

## 2025-06-19 RX ORDER — GADOBUTROL 604.72 MG/ML
10 INJECTION INTRAVENOUS ONCE
Status: COMPLETED | OUTPATIENT
Start: 2025-06-19 | End: 2025-06-19

## 2025-06-19 RX ORDER — ASPIRIN 325 MG
325 TABLET ORAL ONCE
Status: DISCONTINUED | OUTPATIENT
Start: 2025-06-19 | End: 2025-06-20

## 2025-06-19 RX ADMIN — GADOBUTROL 10 ML: 604.72 INJECTION INTRAVENOUS at 23:49

## 2025-06-19 ASSESSMENT — ACTIVITIES OF DAILY LIVING (ADL)
ADLS_ACUITY_SCORE: 54

## 2025-06-19 ASSESSMENT — COLUMBIA-SUICIDE SEVERITY RATING SCALE - C-SSRS
2. HAVE YOU ACTUALLY HAD ANY THOUGHTS OF KILLING YOURSELF IN THE PAST MONTH?: NO
6. HAVE YOU EVER DONE ANYTHING, STARTED TO DO ANYTHING, OR PREPARED TO DO ANYTHING TO END YOUR LIFE?: NO
1. IN THE PAST MONTH, HAVE YOU WISHED YOU WERE DEAD OR WISHED YOU COULD GO TO SLEEP AND NOT WAKE UP?: NO

## 2025-06-19 NOTE — ED TRIAGE NOTES
"Patient with worsening depression and fatigue related to medical conditions, has had several transfusions. Patient feels \"I'm losing my mind\" the last 4 weeks. Patient wants to rule out stroke/cognition issues. Denies SI.        "

## 2025-06-19 NOTE — ED PROVIDER NOTES
Emergency Department Note      History of Present Illness     Chief Complaint   Psychiatric Evaluation and Memory Loss      HPI   Mara Rao is a 75 year old female with history of hereditary hemochromatosis who presents at the emergency department for evaluation of psychiatric concerns and memory loss. The patient reports that she hasn't been herself for the past couple of weeks, as she hit a crisis point today, prompting her son to bring her in. She denies suicidal ideation or homicidal ideation. She acknowledges that her stress is increasing, but denies her depression increasing. She shares that within the last month, she had a blood transfusion and has not been the same since. The patient endorses extreme fatigue and fluid retention. Her son explains that neighbors have called him with concerns of Mara wandering lost and mixing up dates. She endorses difficulty sleeping but denies numbness/tingling, facial droop, speech changes, or additional stroke symptoms. Mara denies chest pain, nausea, vomiting, fever, cough, bloody stools, loss of appetite, urinary symptoms or cold/flu like symptoms. Of note, the patient is willing to speak with mental health assessors.     Independent Historian   None    Review of External Notes   ***    Past Medical History     Medical History and Problem List   Anemia  Hereditary hemochromatosis    Medications   The patient is not currently taking any prescribed medications.     Physical Exam     Patient Vitals for the past 24 hrs:   BP Temp Temp src Pulse Resp SpO2   06/19/25 2042 -- -- -- -- -- 100 %   06/19/25 2037 (!) 147/60 -- -- 82 -- 99 %   06/19/25 1145 138/56 97.3  F (36.3  C) Temporal 88 18 98 %     Physical Exam  Gen: No distress.  Nontoxic.  Head: Atraumatic.  No hematomas, lesions, abrasions  Neck: No midline tenderness of the spine.  Mouth: No oral lesions, or abrasions.  Mucous membranes are moist.  Resp: Equal breath sounds, normal respiratory  effort  Cardio: Regular rate and rhythm, no murmurs  Abdomen: Soft, nontender, nondistended  MSK; no extremity swelling, bruising, or abrasions.  Neuro: Cranial nerves II through XII intact.  5 out of 5 muscle strength in the upper and lower extremities.  Sensation intact in the upper and lower extremities.  Finger-to-nose negative.  Heel-to-shin negative.  No truncal ataxia.  Psych: Appropriate affect.      Diagnostics     Lab Results   Labs Ordered and Resulted from Time of ED Arrival to Time of ED Departure   COMPREHENSIVE METABOLIC PANEL - Abnormal       Result Value    Sodium 141      Potassium 4.5      Carbon Dioxide (CO2) 21 (*)     Anion Gap 13      Urea Nitrogen 13.9      Creatinine 0.75      GFR Estimate 84      Calcium 9.1      Chloride 107      Glucose 98      Alkaline Phosphatase 68      AST 20      ALT 9      Protein Total 7.0      Albumin 4.2      Bilirubin Total 0.6     CBC WITH PLATELETS AND DIFFERENTIAL - Abnormal    WBC Count 4.9      RBC Count 4.53      Hemoglobin 10.6 (*)     Hematocrit 34.8 (*)     MCV 77 (*)     MCH 23.4 (*)     MCHC 30.5 (*)     RDW 15.9 (*)     Platelet Count 228      % Neutrophils 71      % Lymphocytes 21      % Monocytes 8      % Eosinophils 0      % Basophils 1      % Immature Granulocytes 0      NRBCs per 100 WBC 0      Absolute Neutrophils 3.5      Absolute Lymphocytes 1.0      Absolute Monocytes 0.4      Absolute Eosinophils 0.0      Absolute Basophils 0.0      Absolute Immature Granulocytes 0.0      Absolute NRBCs 0.0         Imaging   Head CT w/o contrast   Final Result   IMPRESSION:   1.  New since 3/16/2023, cortical infarct centered at the right inferior frontal gyrus and frontal operculum, age-indeterminate although likely chronic in nature. A brain MRI can be helpful to exclude superimposed acute ischemia, as clinically indicated.   2.  Moderately-sized chronic left PCA distribution infarct, new.   3.  No acute intracranial hemorrhage or mass effect.           EKG   ECG taken at 2039, ECG read at 2042  Normal sinus rhythm    Nonspecific ST wave abnormality   No significant change as compared to prior, dated 3/16/23.  Rate 76 bpm. VA interval 126 ms. QRS duration 88 ms. QT/QTc 388/436 ms. P-R-T axes 81 69 61.    Independent Interpretation   CT Head: {CT Head EPPA:385606}.    ED Course      Medications Administered   Medications   aspirin (ASA) tablet 325 mg (0 mg Oral Hold 6/19/25 2107)     Discussion of Management   Admitting Hospitalist, Dr. Cannon, Madhuri HENDERSON MD    ED Course   ED Course as of 06/19/25 2126   Thu Jun 19, 202519, 2025 1851 I obtained history and examined the patient as noted above    2026 I spoke with Dr. Jha, Neurology [facility] Critical Care/Stroke, to discuss the patient's plan of care.    2028 I rechecked the patient and explained findings. She reports that she is a DNR.       Additional Documentation  None    Medical Decision Making / Diagnosis     CMS Diagnoses: The patient has stroke symptoms:         ED Stroke specific documentation           NIHSS PDF     Patient last known well time: ***  ED Provider first to bedside at: ***  CT Results received at: ***    Thrombolytics:   {given/delayed/not given:466940}    If treating with thrombolytics: Ensure SBP<180 and DBP<105 prior to treatment with thrombolytics.  Administering thrombolytics after treatment with IV labetalol, hydralazine, or nicardipine is reasonable once BP control is established.    Endovascular Retrieval:  {initiated/not initiated:823996}    National Institutes of Health Stroke Scale (Baseline)  Time Performed: ***     Score    Level of consciousness: { :6195618}    LOC questions: { :3626125}    LOC commands: { :8614408}    Best gaze: { :3805707}    Visual: { :8633149}    Facial palsy: { :8788355}    Motor arm (left): { :0792005}    Motor arm (right): { :0705048}    Motor leg (left): { :8321184}    Motor leg (right): { :1913807}    Limb ataxia: { :1188956}    Sensory: { :9797945}    Best  "language: { :9212247}    Dysarthria: { :3852605}    Extinction and inattention: { :3086493}        Total Score:  ***        Stroke Mimics were considered (including migraine headache, seizure disorder, hypoglycemia (or hyperglycemia), head or spinal trauma, CNS infection, Toxin ingestion and shock state (e.g. sepsis) .    {Delays:730609::\"Evaluation/Treatement was delayed due to: ***\",\" \"}     {NIH Stroke Scale Post-CT and subsequent :564346}    MIPS   None       MDM   Mara Rao is a 75 year old female with a history of hemochromatosis and anemia presents to the emergency department with a complaint of increased stress and some memory loss.  Patient reports that she started noticing these changes about 4 weeks ago.  Denies any facial droop, slurred speech, numbness or weakness on one side of her body.  On exam patient does not have any focal findings on neurologic exam.  She is able to answer all of my questions appropriately.  Laboratory work shows a hemoglobin of 10.6, I do not think that she needs a blood transfusion at this point.  I did get a head CT secondary to her alterations in memory.  This does show concern for for acute stroke.      Disposition   The patient was admitted to the hospital.     Diagnosis     ICD-10-CM    1. Ischemic stroke (H)  I63.9            Discharge Medications   New Prescriptions    No medications on file         Scribe Disclosure:  I, David Kraft, am serving as a scribe at 6:51 PM on 6/19/2025 to document services personally performed by Asia John MD based on my observations and the provider's statements to me.     "

## 2025-06-20 ENCOUNTER — HOSPITAL ENCOUNTER (INPATIENT)
Dept: NEUROLOGY | Facility: CLINIC | Age: 75
Discharge: HOME | End: 2025-06-20
Payer: COMMERCIAL

## 2025-06-20 ENCOUNTER — APPOINTMENT (OUTPATIENT)
Dept: CARDIOLOGY | Facility: CLINIC | Age: 75
End: 2025-06-20
Attending: HOSPITALIST
Payer: COMMERCIAL

## 2025-06-20 LAB
ALBUMIN UR-MCNC: 10 MG/DL
APPEARANCE UR: CLEAR
BILIRUB UR QL STRIP: NEGATIVE
CHOLEST SERPL-MCNC: 169 MG/DL
COLOR UR AUTO: YELLOW
ERYTHROCYTE [DISTWIDTH] IN BLOOD BY AUTOMATED COUNT: 16.1 % (ref 10–15)
EST. AVERAGE GLUCOSE BLD GHB EST-MCNC: 103 MG/DL
FOLATE SERPL-MCNC: 12.1 NG/ML (ref 4.6–34.8)
GLUCOSE BLDC GLUCOMTR-MCNC: 142 MG/DL (ref 70–99)
GLUCOSE BLDC GLUCOMTR-MCNC: 98 MG/DL (ref 70–99)
GLUCOSE UR STRIP-MCNC: NEGATIVE MG/DL
HBA1C MFR BLD: 5.2 %
HCT VFR BLD AUTO: 31.7 % (ref 35–47)
HDLC SERPL-MCNC: 67 MG/DL
HGB BLD-MCNC: 9.6 G/DL (ref 11.7–15.7)
HGB UR QL STRIP: NEGATIVE
HIV 1+2 AB+HIV1 P24 AG SERPL QL IA: NONREACTIVE
HOLD SPECIMEN: NORMAL
KETONES UR STRIP-MCNC: 10 MG/DL
LDLC SERPL CALC-MCNC: 84 MG/DL
LEUKOCYTE ESTERASE UR QL STRIP: ABNORMAL
LVEF ECHO: NORMAL
MCH RBC QN AUTO: 23.5 PG (ref 26.5–33)
MCHC RBC AUTO-ENTMCNC: 30.3 G/DL (ref 31.5–36.5)
MCV RBC AUTO: 78 FL (ref 78–100)
MUCOUS THREADS #/AREA URNS LPF: PRESENT /LPF
NITRATE UR QL: NEGATIVE
NONHDLC SERPL-MCNC: 102 MG/DL
PH UR STRIP: 6.5 [PH] (ref 5–7)
PLATELET # BLD AUTO: 226 10E3/UL (ref 150–450)
RBC # BLD AUTO: 4.09 10E6/UL (ref 3.8–5.2)
RBC URINE: 1 /HPF
SP GR UR STRIP: 1.03 (ref 1–1.03)
SQUAMOUS EPITHELIAL: 1 /HPF
TRIGL SERPL-MCNC: 92 MG/DL
TROPONIN T SERPL HS-MCNC: <6 NG/L
TSH SERPL DL<=0.005 MIU/L-ACNC: 1.69 UIU/ML (ref 0.3–4.2)
UROBILINOGEN UR STRIP-MCNC: 2 MG/DL
VIT B12 SERPL-MCNC: 319 PG/ML (ref 232–1245)
WBC # BLD AUTO: 4.3 10E3/UL (ref 4–11)
WBC URINE: 10 /HPF

## 2025-06-20 PROCEDURE — 999N000226 HC STATISTIC SLP IP EVAL DEFER: Performed by: COUNSELOR

## 2025-06-20 PROCEDURE — 36415 COLL VENOUS BLD VENIPUNCTURE: CPT | Performed by: HOSPITALIST

## 2025-06-20 PROCEDURE — 99222 1ST HOSP IP/OBS MODERATE 55: CPT | Mod: 25

## 2025-06-20 PROCEDURE — 95713 VEEG 2-12 HR CONT MNTR: CPT

## 2025-06-20 PROCEDURE — 85027 COMPLETE CBC AUTOMATED: CPT | Performed by: HOSPITALIST

## 2025-06-20 PROCEDURE — 36415 COLL VENOUS BLD VENIPUNCTURE: CPT | Performed by: STUDENT IN AN ORGANIZED HEALTH CARE EDUCATION/TRAINING PROGRAM

## 2025-06-20 PROCEDURE — 95718 EEG PHYS/QHP 2-12 HR W/VEEG: CPT | Performed by: INTERNAL MEDICINE

## 2025-06-20 PROCEDURE — 84484 ASSAY OF TROPONIN QUANT: CPT | Performed by: HOSPITALIST

## 2025-06-20 PROCEDURE — 81003 URINALYSIS AUTO W/O SCOPE: CPT | Performed by: STUDENT IN AN ORGANIZED HEALTH CARE EDUCATION/TRAINING PROGRAM

## 2025-06-20 PROCEDURE — 82746 ASSAY OF FOLIC ACID SERUM: CPT | Performed by: HOSPITALIST

## 2025-06-20 PROCEDURE — 87389 HIV-1 AG W/HIV-1&-2 AB AG IA: CPT | Performed by: STUDENT IN AN ORGANIZED HEALTH CARE EDUCATION/TRAINING PROGRAM

## 2025-06-20 PROCEDURE — 84443 ASSAY THYROID STIM HORMONE: CPT | Performed by: STUDENT IN AN ORGANIZED HEALTH CARE EDUCATION/TRAINING PROGRAM

## 2025-06-20 PROCEDURE — 99232 SBSQ HOSP IP/OBS MODERATE 35: CPT | Performed by: STUDENT IN AN ORGANIZED HEALTH CARE EDUCATION/TRAINING PROGRAM

## 2025-06-20 PROCEDURE — 250N000013 HC RX MED GY IP 250 OP 250 PS 637: Performed by: HOSPITALIST

## 2025-06-20 PROCEDURE — 93306 TTE W/DOPPLER COMPLETE: CPT | Mod: 26 | Performed by: INTERNAL MEDICINE

## 2025-06-20 PROCEDURE — 93306 TTE W/DOPPLER COMPLETE: CPT

## 2025-06-20 PROCEDURE — 250N000013 HC RX MED GY IP 250 OP 250 PS 637

## 2025-06-20 PROCEDURE — 87088 URINE BACTERIA CULTURE: CPT | Performed by: STUDENT IN AN ORGANIZED HEALTH CARE EDUCATION/TRAINING PROGRAM

## 2025-06-20 PROCEDURE — 120N000001 HC R&B MED SURG/OB

## 2025-06-20 RX ORDER — ASPIRIN 81 MG/1
81 TABLET, CHEWABLE ORAL DAILY
Status: DISCONTINUED | OUTPATIENT
Start: 2025-06-21 | End: 2025-06-20

## 2025-06-20 RX ORDER — ASPIRIN 81 MG/1
81 TABLET, CHEWABLE ORAL DAILY
Status: DISCONTINUED | OUTPATIENT
Start: 2025-06-20 | End: 2025-06-23 | Stop reason: HOSPADM

## 2025-06-20 RX ORDER — ASPIRIN 81 MG/1
81 TABLET, CHEWABLE ORAL DAILY
Status: DISCONTINUED | OUTPATIENT
Start: 2025-06-20 | End: 2025-06-20

## 2025-06-20 RX ORDER — ASPIRIN 81 MG/1
81 TABLET, CHEWABLE ORAL DAILY
Status: DISCONTINUED | OUTPATIENT
Start: 2025-06-20 | End: 2025-06-20 | Stop reason: ALTCHOICE

## 2025-06-20 RX ORDER — LIDOCAINE 40 MG/G
CREAM TOPICAL
Status: DISCONTINUED | OUTPATIENT
Start: 2025-06-20 | End: 2025-06-23 | Stop reason: HOSPADM

## 2025-06-20 RX ADMIN — ASPIRIN 81 MG CHEWABLE TABLET 81 MG: 81 TABLET CHEWABLE at 09:23

## 2025-06-20 ASSESSMENT — ACTIVITIES OF DAILY LIVING (ADL)
ADLS_ACUITY_SCORE: 54
ADLS_ACUITY_SCORE: 54
ADLS_ACUITY_SCORE: 31
ADLS_ACUITY_SCORE: 55
ADLS_ACUITY_SCORE: 31
ADLS_ACUITY_SCORE: 54
DEPENDENT_IADLS:: INDEPENDENT
ADLS_ACUITY_SCORE: 57
ADLS_ACUITY_SCORE: 57
ADLS_ACUITY_SCORE: 54

## 2025-06-20 NOTE — PROGRESS NOTES
[facility] , 2 HR VIDEO, PORTABLE, PHOTIC ACTIVATION, AWAKE AND LIGHT SLEEP CAPTURED  ORDERING PROVIDER PAUL MARTIN

## 2025-06-20 NOTE — PLAN OF CARE
Goal Outcome Evaluation:    Plan of Care Reviewed With: patient    Overall Patient Progress: improvingOverall Patient Progress: improving    Pt here with acute toxic metabolic encephalopathy, unclear etiology, found a late subacute R frontal infarct, has a chronic L temporal, L occipital lobe CVA. Hx hereditary hemochromatosis, post-phlebotomy anemia w/ severe fatigue. A&O x4, forgetful. VSS, on RA. LS clear. Tele NSR. Denies pain. CMS and Neuro's intact. Up SBA w/ GB. Cont of B&B. Voiding adequately. UA collected, showing signs of UTI, UC pending. +BS, last BM 6/19 per patient. EEG completed. Reg diet. Pt scoring green on Aggression Stop Light Tool. Discharge pending.

## 2025-06-20 NOTE — ED NOTES
[facility]  ED Nurse Handoff Report    ED Chief complaint: Psychiatric Evaluation and Memory Loss      ED Diagnosis:   Final diagnoses:   Ischemic stroke (H)       Code Status: DNR / DNI    Allergies: No Known Allergies    Patient Story:  75 year old female who presented on 6/19/2025 along with her son with complaints of confusion and fatigue.Today she was visiting a friend who noticed that patient was not at her baseline with her mentation and was slightly confused.  Called her son who brought her to the ER. In the ER patient is oriented to time place and person and answering questions appropriately but also does admit that she feels a little more confused and quite weak.  Son reports multiple incidents where she was quite forgetful.  No focal neurologic deficits.  No recent trauma.  Denies any fever, chills, shortness of breath, nausea, vomiting, headache or any other symptoms. Imaging shows strokes:      Results for orders placed or performed during the hospital encounter of 06/19/25   Head CT w/o contrast    Impression    IMPRESSION:  1.  New since 3/16/2023, cortical infarct centered at the right inferior frontal gyrus and frontal operculum, age-indeterminate although likely chronic in nature. A brain MRI can be helpful to exclude superimposed acute ischemia, as clinically indicated.  2.  Moderately-sized chronic left PCA distribution infarct, new.  3.  No acute intracranial hemorrhage or mass effect.   MR Brain w/o & w Contrast    Impression    IMPRESSION:  HEAD MRI:  1.  3.8 x 3 cm (AP x TR) late subacute infarct in the right frontal operculum with cortical laminar necrosis.  2.  Chronic infarct left temporal/occipital lobe.  3.  Mild age-related changes.    HEAD MRA:  No stenosis/occlusion, aneurysm, or high flow vascular malformation.    NECK MRA:  No measurable stenosis or dissection.     MRA Angiogram Head w/o Contrast    Impression    IMPRESSION:  HEAD MRI:  1.  3.8 x 3 cm (AP x TR) late  subacute infarct in the right frontal operculum with cortical laminar necrosis.  2.  Chronic infarct left temporal/occipital lobe.  3.  Mild age-related changes.    HEAD MRA:  No stenosis/occlusion, aneurysm, or high flow vascular malformation.    NECK MRA:  No measurable stenosis or dissection.     MRA Angiogram Neck w/o & w Contrast    Impression    IMPRESSION:  HEAD MRI:  1.  3.8 x 3 cm (AP x TR) late subacute infarct in the right frontal operculum with cortical laminar necrosis.  2.  Chronic infarct left temporal/occipital lobe.  3.  Mild age-related changes.    HEAD MRA:  No stenosis/occlusion, aneurysm, or high flow vascular malformation.    NECK MRA:  No measurable stenosis or dissection.        Focused Assessment:  Alert/oriented x 4 but forgetful. No focal deficits. Moves all extremities. Room air. Normal HR/rhythm. Continent. Pt refusing medications.     Treatments and/or interventions provided: PIV, imaging, labs  Patient's response to treatments and/or interventions: Stable, ready to transfer to floor  Current Facility-Administered Medications   Medication Dose Route Frequency Provider Last Rate Last Admin    aspirin (ASA) chewable tablet 81 mg  81 mg Oral or Feeding Tube Daily Madhuri Cannon MD        aspirin (ASA) tablet 325 mg  325 mg Oral Once Madhuri Cannon MD        lidocaine (LMX4) cream   Topical Q1H PRN Madhuri Cannon MD        lidocaine 1 % 0.1-1 mL  0.1-1 mL Other Q1H PRN Madhuri Cannon MD        sodium chloride (PF) 0.9% PF flush 3 mL  3 mL Intracatheter Q8H [facility] Madhuri Cannon MD        sodium chloride (PF) 0.9% PF flush 3 mL  3 mL Intracatheter q1 min prn Madhuri Cannon MD         No current outpatient medications on file.       To be done/followed up on inpatient unit:  Echo, Neuro checks, consults    Does this patient have any cognitive concerns?: NA    Activity level - Baseline/Home:  Independent  Activity Level - Current:   Stand with Assist    Patient's Preferred  language: English   Needed?: No    Isolation: None  Infection: Not Applicable  Sepsis treatment initiated: No  Patient tested for COVID 19 prior to admission: NO    Cardiac Rhythm: Sinus Rhythm    For the majority of the shift this patient's behavior was Green.   Behavioral interventions performed were NA.    ED NURSE PHONE NUMBER: [phone]

## 2025-06-20 NOTE — ED NOTES
"  [facility]    Stroke Telephone Note    I was called by Asia John on 06/19/25 regarding patient Mara Rao. The patient is a 75 year old female with a history of hemochromatosis vs polycythemia (notes inconsistent) with regular ?transfusions (per ER MD) vs phlebotomy (per prior notes in 2023). Presenting with 4 weeks of memory loss & confusion. NCCT in ER shows R inferior frontal gyrus/frontal opercular and L PCA infarcts that are new since last neuroimaging in 2023, indeterminate chronicity. Exam is nonfocal per Dr John    Vitals  BP: 138/56   Pulse: 88   Resp: 18   Temp: 97.3  F (36.3  C)        Imaging Findings  CT head: 1.  New since 3/16/2023, cortical infarct centered at the right inferior frontal gyrus and frontal operculum, age-indeterminate although likely chronic in nature. A brain MRI can be helpful to exclude superimposed acute ischemia, as clinically indicated.  2.  Moderately-sized chronic left PCA distribution infarct, new.  3.  No acute intracranial hemorrhage or mass effect.    Impression  Acute ischemic stroke of R frontal and L occipital lobes due to undetermined etiology     Recommendations  - Use orderset: \"Ischemic Stroke Routine Admission\" or \"Ischemic Stroke No Thrombolytics/No Thrombectomy ICU Admission\"  - Place Neurology IP Stroke Consult order   - Neurochecks and Vital Signs every 4h   - Permissive HTN; goal SBP < 220 mmHg  - Daily aspirin 81 mg for secondary stroke prevention  - Statin: pending LDL, goal <70  - MRI Brain with and without contrast    - MRA Brain without contrast  - MRA Neck with and without contrast  - TTE (with Bubble Study if age 60 yrs or less)  - Telemetry, EKG  - Bedside Glucose Monitoring  - A1c, Lipid Panel, Troponin x 3  - PT/OT/SLP  - Stroke Education  - Euthermia, Euglycemia  - Cardiac monitoring  - If chronicity of strokes on MRI do not correlate with <=4 weeks of symptoms, consider additional " "non-neurovascular cognitive workup    My recommendations are based on the information provided over the phone by Mara Rao's in-person providers. They are not intended to replace the clinical judgment of her in-person providers. I was not requested to personally see or examine the patient at this time.     Cassidy Voss MD  Vascular Neurology    To page me or covering stroke neurology team member, click here: [facility]  Choose \"On Call\" tab at top, then select \"NEUROLOGY/ALL SITES\" from middle drop-down box, press Enter, then look for \"stroke\" or \"telestroke\" for your site.         " normal (ped)...

## 2025-06-20 NOTE — H&P
[facility]    History and Physical  Hospitalist       Date of Admission:  6/19/2025    Assessment & Plan   Mara Rao is a 75 year old female who presented on 6/19/2025 along with her son with complaints of confusion and fatigue.  Patient has a diagnosis of hemochromatosis and undergoes regular phlebotomies with the last one in May 2025.  Since then patient has been feeling more fatigued and weak.  Today she was visiting a friend who noticed that patient was not at her baseline with her mentation and was slightly confused.  Called her son who brought her to the ER.    In the ER patient is oriented to time place and person and answering questions appropriately but also does admit that she feels a little more confused and quite weak.  Son reports multiple incidents where she was quite forgetful last.  No focal neurologic deficits.  No recent trauma.  Denies any fever, chills, shortness of breath, nausea, vomiting, headache or any other symptoms.    Workup showed CT head  New since 3/16/2023, cortical infarct centered at the right inferior frontal gyrus and frontal operculum, age-indeterminate although likely chronic in nature. A brain MRI can be helpful to exclude superimposed acute ischemia, as clinically indicated.  2.  Moderately-sized chronic left PCA distribution infarct, new.  3.  No acute intracranial hemorrhage or mass effect.    ER team contacted stroke neurology team who advised inpatient admission for further stroke workup.  BMP within normal limits.  CBC shows hemoglobin of 10.6 with MCV of 77.  Patient is currently not on any home medications.    #Altered mental status of unclear etiology  Chronic strokes  -Could be related to post phlebotomy anemia with fatigue versus acute stroke versus progressive vascular dementia  - Has no focal neurologic deficits.  - Received aspirin 325 mg once.  Continue on aspirin 81 daily.  - Started on stroke protocol with neurochecks and NIH  stroke scale.  Stroke neurology consulted.  - MRI brain with and without contrast, MRA brain and MRA neck ordered.  - PT/OT/speech therapy consulted.  - Occupational therapy consulted for cognitive evaluation.  - Echocardiogram ordered.  Cardiac monitoring ordered.  - Lipid panel, HbA1c ordered.  Defer statin until lipid panel is resulted.    #Hereditary hemochromatosis  Post phlebotomy anemia with severe fatigue    Patient has had trouble with close phlebotomy anemia for a while now.  Her last phlebotomy was in May 2025.  - Hemoglobin at 10.6.  MCV 77.  - Iron studies, vitamin B12 and folate ordered.  - MOHPA consulted.  -EKG shows sinus rhythm.    Goals of care discussion-patient is very clear that she is DNR/DNI.  She would want minimal interventions and clear discussion before escalating any level of care.  Son at bedside and agrees with this.      Clinically Significant Risk Factors Present on Admission                        # Anemia: based on hgb <11                   DVT Prophylaxis: Pneumatic Compression Devices  Code Status: DNR / DNI  Medically Ready for Discharge: Anticipated in 2-4 Days        Madhuri Cannon MD, MD  [phone] (p)  [phone] (c)    Primary Care Physician   Marilyn Chappell    Chief Complaint   Weakness and confusion    History is obtained from the patient and review of medical records.    History of Present Illness   Mara Rao is a 75 year old female who presented on 6/19/2025 along with her son with complaints of confusion and fatigue.  Patient has a diagnosis of hemochromatosis and undergoes regular phlebotomies with the last one in May 2025.  Since then patient has been feeling more fatigued and weak.  Today she was visiting a friend who noticed that patient was not at her baseline with her mentation and was slightly confused.  Called her son who brought her to the ER.    In the ER patient is oriented to time place and person and answering questions appropriately but also  does admit that she feels a little more confused and quite weak.  Son reports multiple incidents where she was quite forgetful last.  No focal neurologic deficits.  No recent trauma.  Denies any fever, chills, shortness of breath, nausea, vomiting, headache or any other symptoms.    Workup showed CT head  New since 3/16/2023, cortical infarct centered at the right inferior frontal gyrus and frontal operculum, age-indeterminate although likely chronic in nature. A brain MRI can be helpful to exclude superimposed acute ischemia, as clinically indicated.  2.  Moderately-sized chronic left PCA distribution infarct, new.  3.  No acute intracranial hemorrhage or mass effect.    ER team contacted stroke neurology team who advised inpatient admission for further stroke workup.  BMP within normal limits.  CBC shows hemoglobin of 10.6 with MCV of 77.  Patient is currently not on any home medications.    Past Medical History    I have reviewed this patient's medical history and updated it with pertinent information if needed.   No past medical history on file.  Past Surgical History   I have reviewed this patient's surgical history and updated it with pertinent information if needed.  No past surgical history on file.  Prior to Admission Medications   None     Allergies   No Known Allergies  Social History   I have reviewed this patient's social history and updated it with pertinent information if needed.   Mara   [place]     Family History   I have reviewed this patient's family history and updated it with pertinent information if needed.    No data available          Review of Systems   The 10 point Review of Systems is negative other than noted in the HPI or here.     Physical Exam   Temp: 97.3  F (36.3  C) Temp src: Temporal BP: 106/51 Pulse: 75   Resp: 15 SpO2: 99 % O2 Device: None (Room air)    Vital Signs with Ranges  Temp:  [97.3  F (36.3  C)] 97.3  F (36.3  C)  Pulse:  [75-88] 75  Resp:  [12-18] 15  BP:  (106-147)/(51-60) 106/51  SpO2:  [98 %-100 %] 99 %  0 lbs 0 oz    Physical Exam  Constitutional:       Appearance: Normal appearance.      Comments: Thin and frail   Cardiovascular:      Rate and Rhythm: Normal rate and regular rhythm.      Pulses: Normal pulses.      Heart sounds: Normal heart sounds.   Pulmonary:      Effort: Pulmonary effort is normal. No respiratory distress.      Breath sounds: Normal breath sounds.   Abdominal:      General: Abdomen is flat. Bowel sounds are normal. There is no distension.      Tenderness: There is no abdominal tenderness. There is no guarding.   Skin:     General: Skin is warm and dry.   Neurological:      General: No focal deficit present.         Data   Data reviewed today:  I personally reviewed the head CT image(s) showing cortical infarct in the right inferior frontal gyrus and frontal operculum, chronic in nature.  Moderate-sized chronic left PCA distribution infarct..  Recent Labs   Lab 06/19/25  1159   WBC 4.9   HGB 10.6*   MCV 77*         POTASSIUM 4.5   CHLORIDE 107   CO2 21*   BUN 13.9   CR 0.75   ANIONGAP 13   GIUSEPPE 9.1   GLC 98   ALBUMIN 4.2   PROTTOTAL 7.0   BILITOTAL 0.6   ALKPHOS 68   ALT 9   AST 20       Recent Results (from the past 24 hours)   Head CT w/o contrast    Narrative    EXAM: CT HEAD W/O CONTRAST  LOCATION: [facility]  DATE: 6/19/2025    INDICATION: Confusion  COMPARISON: CT dated 3/16/2023  TECHNIQUE: Routine CT Head without IV contrast. Multiplanar reformats. Dose reduction techniques were used.    FINDINGS:  INTRACRANIAL CONTENTS: No acute intracranial hemorrhage, extra-axial fluid collection, or mass effect. New since the prior exam, small- to moderate-sized region of gliosis and encephalomalacia centered at the right inferior frontal gyrus and frontal   operculum with involvement of the insula inferiorly most likely represents a chronic infarct although portions demonstrate ill-defined margins with  superimposed acute ischemia are not excluded. Moderate-sized chronic left posterior cerebral artery   distribution infarct involving the inferolateral aspect of the left occipital and parietal lobes, new. Otherwise, no significant brain parenchymal attenuation abnormality or evidence of an acute transcortical infarct. Slightly progressed mild generalized   cerebral parenchymal volume loss. No hydrocephalus.     VISUALIZED ORBITS/SINUSES/MASTOIDS: No acute intraorbital finding. No evidence of significant paranasal sinus or mastoid mucosal disease.     BONES/SOFT TISSUES: No acute abnormality.      Impression    IMPRESSION:  1.  New since 3/16/2023, cortical infarct centered at the right inferior frontal gyrus and frontal operculum, age-indeterminate although likely chronic in nature. A brain MRI can be helpful to exclude superimposed acute ischemia, as clinically indicated.  2.  Moderately-sized chronic left PCA distribution infarct, new.  3.  No acute intracranial hemorrhage or mass effect.

## 2025-06-20 NOTE — PROGRESS NOTES
[facility]  ED Nurse Handoff Report    ED Chief complaint: Psychiatric Evaluation and Memory Loss      ED Diagnosis:   Final diagnoses:   Ischemic stroke (H)   RECEIVING UNIT ED HANDOFF REVIEW    ED Nurse Handoff Report was reviewed by: Víctor Vazquez RN on June 20, 2025 at 2:00 PM

## 2025-06-20 NOTE — PHARMACY-ADMISSION MEDICATION HISTORY
Pharmacist Admission Medication History    Admission medication history is complete. The information provided in this note is only as accurate as the sources available at the time of the update.    Information Source(s): Patient and CareEverywhere/SureScripts via in-person    Pertinent Information:   - Patient reports no prescription or otc medications    Changes made to PTA medication list:  Added: None  Deleted: None  Changed: None    Allergies reviewed with patient and updates made in EHR: yes    Medication History Completed By: Criselda Nieves [facility] 6/19/2025 10:25 PM    No outpatient medications have been marked as taking for the 6/19/25 encounter (Hospital Encounter).

## 2025-06-20 NOTE — PROGRESS NOTES
[facility]    Medicine Progress Note - Hospitalist Service    Date of Admission:  6/19/2025    Assessment & Plan   Mara Rao is a 75 year old female with history of hereditary hemochromatosis who was admitted on 6/19/2025 with confusion and fatigue of unclear etiology.     Acute toxic metabolic encephalopathy, unclear etiology  Late subacute R frontal infarct  Chronic L temporal, L occipital lobe CVA  Patient presenting with progressive confusion, fatigue since her most recent phlebotomy in May 2025.  She was brought to the ED after a friend noticed worsening confusion.  Patient's son further reports worsening forgetfulness.  No electrolyte abnormalities, hypoxia, trauma or injury, infectious signs or symptoms, focal deficits. B12, folate, HIV normal. Stroke code called on admission with workup notable late subacute R frontal infarct and chronic L temporal, L occipital infarcts. TTE stable from prior. Discussed with Stroke Neurology and do not feel subacute R infarct is driving mental status changes. Overall unclear etiology of AMS, possible ?vascular dementia vs infectious vs metabolic vs seizure vs post-phlebotomy vs other.  -Continue ASA  -Follow-up EEG, UA, TSH  -Neurochecks q4h  -Pending improved mental status, consider Neurology vs Psychiatry consultation  -Stroke Neurology following  -PT, OT, SW consulted  -Delirium precautions    Hereditary hemochromatosis  Post-phlebotomy anemia w/ severe fatigue  Patient with history of post-phlebotomy anemia. Last phlebotomy in May 2025. Unclear recent hemoglobin baseline, hgb ~10 this admission. Initial iron studies with likely component of iron deficiency.  -Daily CBC, follow-up ferritin  -Heme/Onc consulted          Diet: Regular Diet Adult    DVT Prophylaxis: Pneumatic Compression Devices and Ambulate every shift  Martinez Catheter: Not present  Lines: None     Cardiac Monitoring: ACTIVE order. Indication: Stroke, acute (48  hours)  Code Status: No CPR- Do NOT Intubate      Clinically Significant Risk Factors Present on Admission                        # Anemia: based on hgb <11       # Cachexia: Estimated body mass index is 17.58 kg/m  as calculated from the following:    Height as of this encounter: 1.524 m (5').    Weight as of this encounter: 40.8 kg (90 lb).              Social Drivers of Health            Disposition Plan     Medically Ready for Discharge: Anticipated in 2-4 Days     Neo Elkins MD  Hospitalist Service  [facility]  Securely message with [employer] (more info)  Text page via [facility] Paging/Directory   ______________________________________________________________________    Interval History   Boarded in ED overnight. Denies concerns or new deficits. No fever, chills, chest pain, n/v, SOB. Updated on plan for ongoing investigation into AMS.    Physical Exam   Vital Signs: Temp: 98  F (36.7  C) Temp src: Oral BP: 117/62 Pulse: 73   Resp: 16 SpO2: 99 % O2 Device: None (Room air)    Weight: 90 lbs 0 oz    General: Awake, alert, NAD, sitting upright in bed  HEENT: Atraumatic, normocephalic, EOMI, no scleral icterus  CV: RRR, no murmurs, no APURVA, distal pulses intact  Pulm: CTAB, breathing comfortably on RA, no wheezes, no crackles  Abd: Soft, non-tender, non-distended  Skin: No rashes, lesions, or wounds visualized  Neuro: AOx2, CN II-XII grossly intact, no focal deficits, moving all extremities spontaneously, speech normal, tangential thought content    Medical Decision Making       45 MINUTES SPENT BY ME on the date of service doing chart review, history, exam, documentation & further activities per the note.      Data   ------------------------- PAST 24 HR DATA REVIEWED -----------------------------------------------

## 2025-06-20 NOTE — CONSULTS
Care Management Initial Consult    General Information  Assessment completed with: Patient,    Type of CM/SW Visit: Initial Assessment    Primary Care Provider verified and updated as needed: Yes   Readmission within the last 30 days: no previous admission in last 30 days      Reason for Consult: discharge planning  Advance Care Planning: Advance Care Planning Reviewed: present on chart          Communication Assessment  Patient's communication style: spoken language (English or Bilingual)             Cognitive  Cognitive/Neuro/Behavioral: WDL  Level of Consciousness: alert  Arousal Level: opens eyes spontaneously  Orientation: oriented x 4  Mood/Behavior: calm, cooperative  Best Language: 0 - No aphasia  Speech: spontaneous, clear    Living Environment:   People in home: alone     Current living Arrangements: condominium      Able to return to prior arrangements: yes       Family/Social Support:  Care provided by: self  Provides care for: no one  Marital Status:   Support system:            Description of Support System: Supportive, Involved    Support Assessment: Adequate family and caregiver support, Adequate social supports    Current Resources:   Patient receiving home care services: No        Community Resources: None  Equipment currently used at home: none  Supplies currently used at home: None    Employment/Financial:  Employment Status: self-employed        Financial Concerns: none   Referral to Financial Worker: No       Does the patient's insurance plan have a 3 day qualifying hospital stay waiver?  No    Lifestyle & Psychosocial Needs:  Social Drivers of Health     Food Insecurity: Not on file   Depression: Not on file   Housing Stability: Not on file   Tobacco Use: Not on file   Financial Resource Strain: Not on file   Alcohol Use: Not on file   Transportation Needs: Not on file   Physical Activity: Not on file   Interpersonal Safety: Not on file   Stress: Not on file   Social Connections: Not  "on file   Health Literacy: Not on file       Functional Status:  Prior to admission patient needed assistance:   Dependent ADLs:: Independent  Dependent IADLs:: Independent       Mental Health Status:  Mental Health Status: No Current Concerns       Chemical Dependency Status:  Chemical Dependency Status: No Current Concerns             Values/Beliefs:  Spiritual, Cultural Beliefs, Jewish Practices, Values that affect care: yes               Discussed  Partnership in Safe Discharge Planning  document with patient/family: No    Additional Information:            Per notes patient \"consult placed for discharge planning.\"       Writer introduced self and role to patient.  See consult information above. Pt lives in a condo  alone.  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 Pt support system includes 2 sons, Steve & Jarett.  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   Pt has no financial concerns.         Pt's PCP is   Marilyn Chappell [phone] [id number] [facility] [address], [facility] [address]         Next Steps: Await therapy recs and assist with discharge planning    Aby Pastrana RN BSN  Care Coordination  [facility]      "

## 2025-06-20 NOTE — CONSULTS
Consultation    Mara Rao MRN# [id number]   YOB: 1950 Age: 75 year old   Date of Admission: 6/19/2025  Requesting physician: Dr. 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 Solis, last seen on 5/29/2025  - Has needed periodic phlebotomy to reduced iron levels, last 3/13/2025  - She does have trouble feeling symptomatic after phlebotomies so she does get IV hydration. At the last visit, Dr. Solis proposed taking less volume at each phlebotomy (250 mL) going forward.    Anemia  - She has iron deficiency after repeated phlebotomies   - Hemoglobin on 5/29 was 10.3 g/dL with MCV 79  - Iron studies 5/29- total iron 47, TIBC 344, % saturation 14 and ferritin 6.2    CVA  Encephalopathy  - Medicine and Neurology work up in process  - With reduced levels of iron, I would not expect the hemachromatosis to be the cause of her mental status symptoms     Joseph GIL, [facility]  [phone] (office)              Chief Complaint:   Psychiatric Evaluation and Memory Loss         History of Present Illness:   Mara presents to the hospital due to subacute changes in mood and memory.    In the ED, she underwent labs and imaging. She has been evaluated by Neurology. She has a history of hemachromatosis needing periodic phlebotomies. We were asked to see her regarding labs and her symptoms    She last saw Dr. Solis on 5/29/2025. Her last phlebotomy was 3/2025.  At that visit, with a low hemoglobin and low ferritin further phlebotomies were put on hold until her next labs and follow up in 8/2025. He had planned on cutting back on the amount of blood taken due to her post-phlebotomy symptoms.         Physical Exam:   Vitals were reviewed  Blood pressure 117/62, pulse 73, temperature 98  F (36.7  C), temperature source Oral, resp. rate 16, height 1.524 m  (5'), weight 40.8 kg (90 lb), SpO2 99%.  Temperatures:  Current - Temp: 98  F (36.7  C); Max - Temp  Av  F (36.7  C)  Min: 98  F (36.7  C)  Max: 98  F (36.7  C)  Respiration range: Resp  Av.8  Min: 12  Max: 16  Pulse range: Pulse  Av.5  Min: 65  Max: 82  Blood pressure range: Systolic (24hrs), Av , Min:90 , Max:147   ; Diastolic (24hrs), Av, Min:42, Max:62    Pulse oximetry range: SpO2  Av.5 %  Min: 97 %  Max: 100 %  No intake or output data in the 24 hours ending 25 1531    GENERAL: No acute distress.  NEURO: Getting an EEG.              Past Medical History:   I have reviewed this patient's past medical history  No past medical history on file.          Past Surgical History:   I have reviewed this patient's past surgical history  No past surgical history on file.            Social History:   I have reviewed this patient's social history  Social History     Tobacco Use    Smoking status: Not on file    Smokeless tobacco: Not on file   Substance Use Topics    Alcohol use: Not on file             Family History:   I have reviewed this patient's family history  No family history on file.          Allergies:   No Known Allergies          Medications:   I have reviewed this patient's current medications  No medications prior to admission.     Current Facility-Administered Medications   Medication Dose Route Frequency Provider Last Rate Last Admin    aspirin (ASA) chewable tablet 81 mg  81 mg Oral Daily Kathy Leyva PA-C        lidocaine (LMX4) cream   Topical Q1H PRN Madhuri Cannon MD        lidocaine 1 % 0.1-1 mL  0.1-1 mL Other Q1H PRN Madhuri Cannon MD        sodium chloride (PF) 0.9% PF flush 3 mL  3 mL Intracatheter Q8H [facility] Madhuri Cannon MD   3 mL at 25 0610    sodium chloride (PF) 0.9% PF flush 3 mL  3 mL Intracatheter q1 min prMadhuri Jenkins MD                 Review of Systems:     The 10 point Review of Systems is negative other than noted in the  HPI.            Data:   Data   Results for orders placed or performed during the hospital encounter of 06/19/25 (from the past 24 hours)   Head CT w/o contrast    Narrative    EXAM: CT HEAD W/O CONTRAST  LOCATION: [facility]  DATE: 6/19/2025    INDICATION: Confusion  COMPARISON: CT dated 3/16/2023  TECHNIQUE: Routine CT Head without IV contrast. Multiplanar reformats. Dose reduction techniques were used.    FINDINGS:  INTRACRANIAL CONTENTS: No acute intracranial hemorrhage, extra-axial fluid collection, or mass effect. New since the prior exam, small- to moderate-sized region of gliosis and encephalomalacia centered at the right inferior frontal gyrus and frontal   operculum with involvement of the insula inferiorly most likely represents a chronic infarct although portions demonstrate ill-defined margins with superimposed acute ischemia are not excluded. Moderate-sized chronic left posterior cerebral artery   distribution infarct involving the inferolateral aspect of the left occipital and parietal lobes, new. Otherwise, no significant brain parenchymal attenuation abnormality or evidence of an acute transcortical infarct. Slightly progressed mild generalized   cerebral parenchymal volume loss. No hydrocephalus.     VISUALIZED ORBITS/SINUSES/MASTOIDS: No acute intraorbital finding. No evidence of significant paranasal sinus or mastoid mucosal disease.     BONES/SOFT TISSUES: No acute abnormality.      Impression    IMPRESSION:  1.  New since 3/16/2023, cortical infarct centered at the right inferior frontal gyrus and frontal operculum, age-indeterminate although likely chronic in nature. A brain MRI can be helpful to exclude superimposed acute ischemia, as clinically indicated.  2.  Moderately-sized chronic left PCA distribution infarct, new.  3.  No acute intracranial hemorrhage or mass effect.   EKG 12 lead   Result Value Ref Range    Systolic Blood Pressure  mmHg    Diastolic Blood  Pressure  mmHg    Ventricular Rate 76 BPM    Atrial Rate 76 BPM    IN Interval 126 ms    QRS Duration 88 ms     ms    QTc 436 ms    P Axis 81 degrees    R AXIS 69 degrees    T Axis 61 degrees    Interpretation ECG       Sinus rhythm  Nonspecific ST abnormality  Abnormal ECG  When compared with ECG of 16-Mar-2023 11:59,  Nonspecific T wave abnormality no longer evident in Anterior leads  Confirmed by GENERATED REPORT, COMPUTER ([id number]),  Alexander Stout ([id number]) on 6/19/2025 9:21:33 PM     MR Brain w/o & w Contrast    Narrative    EXAM: MR BRAIN W/O and W CONTRAST, MRA BRAIN ([place]) W/O CONTRAST, MRA NECK (CAROTIDS) W/O and W CONTRAST  LOCATION: [facility]  DATE: 6/19/2025    INDICATION: confusion, difficulty word finding  COMPARISON: CTA head and neck 3/16/2023  CONTRAST: 10 Gadavist  TECHNIQUE:   1) Routine multiplanar multisequence head MRI without and with intravenous contrast.  2) 3D time-of-flight head MRA without intravenous contrast.  3) Neck MRA without and with IV contrast. Stenosis measurements made according to NASCET criteria unless otherwise specified.    FINDINGS:  HEAD MRI:  INTRACRANIAL CONTENTS: 3.8 x 3 cm (AP by TR) late subacute infarct in the right frontal operculum with cortical laminar necrosis. No mass, acute hemorrhage, or extra-axial fluid collections. Scattered nonspecific T2/FLAIR hyperintensities within the   cerebral white matter most consistent with mild chronic microvascular ischemic change. Chronic infarct left temporal/occipital lobe. Normal ventricles and sulci. Normal position of the cerebellar tonsils. No pathologic contrast enhancement.    SELLA: No abnormality accounting for technique.    OSSEOUS STRUCTURES/SOFT TISSUES: Diffusely heterogeneous, but overall benign marrow signal pattern. The major intracranial vascular flow voids are maintained.     ORBITS: No abnormality accounting for technique.     SINUSES/MASTOIDS: No paranasal  sinus mucosal disease. No middle ear or mastoid effusion.     HEAD MRA:   ANTERIOR CIRCULATION: No stenosis/occlusion, aneurysm, or high flow vascular malformation. Standard [place] of [place] anatomy.    POSTERIOR CIRCULATION: No stenosis/occlusion, aneurysm, or high flow vascular malformation. Dominant right and smaller left vertebral artery contribute to a normal basilar artery.     NECK MRA:   RIGHT CAROTID: No measurable stenosis or dissection.    LEFT CAROTID: No measurable stenosis or dissection.    VERTEBRAL ARTERIES: No focal stenosis or dissection. Dominant right and smaller left vertebral arteries.    AORTIC ARCH: Classic aortic arch anatomy with no significant stenosis at the origin of the great vessels.      Impression    IMPRESSION:  HEAD MRI:  1.  3.8 x 3 cm (AP x TR) late subacute infarct in the right frontal operculum with cortical laminar necrosis.  2.  Chronic infarct left temporal/occipital lobe.  3.  Mild age-related changes.    HEAD MRA:  No stenosis/occlusion, aneurysm, or high flow vascular malformation.    NECK MRA:  No measurable stenosis or dissection.     MRA Angiogram Head w/o Contrast    Narrative    EXAM: MR BRAIN W/O and W CONTRAST, MRA BRAIN ([place]) W/O CONTRAST, MRA NECK (CAROTIDS) W/O and W CONTRAST  LOCATION: [facility]  DATE: 6/19/2025    INDICATION: confusion, difficulty word finding  COMPARISON: CTA head and neck 3/16/2023  CONTRAST: 10 Gadavist  TECHNIQUE:   1) Routine multiplanar multisequence head MRI without and with intravenous contrast.  2) 3D time-of-flight head MRA without intravenous contrast.  3) Neck MRA without and with IV contrast. Stenosis measurements made according to NASCET criteria unless otherwise specified.    FINDINGS:  HEAD MRI:  INTRACRANIAL CONTENTS: 3.8 x 3 cm (AP by TR) late subacute infarct in the right frontal operculum with cortical laminar necrosis. No mass, acute hemorrhage, or extra-axial fluid collections.  Scattered nonspecific T2/FLAIR hyperintensities within the   cerebral white matter most consistent with mild chronic microvascular ischemic change. Chronic infarct left temporal/occipital lobe. Normal ventricles and sulci. Normal position of the cerebellar tonsils. No pathologic contrast enhancement.    SELLA: No abnormality accounting for technique.    OSSEOUS STRUCTURES/SOFT TISSUES: Diffusely heterogeneous, but overall benign marrow signal pattern. The major intracranial vascular flow voids are maintained.     ORBITS: No abnormality accounting for technique.     SINUSES/MASTOIDS: No paranasal sinus mucosal disease. No middle ear or mastoid effusion.     HEAD MRA:   ANTERIOR CIRCULATION: No stenosis/occlusion, aneurysm, or high flow vascular malformation. Standard [place] of [place] anatomy.    POSTERIOR CIRCULATION: No stenosis/occlusion, aneurysm, or high flow vascular malformation. Dominant right and smaller left vertebral artery contribute to a normal basilar artery.     NECK MRA:   RIGHT CAROTID: No measurable stenosis or dissection.    LEFT CAROTID: No measurable stenosis or dissection.    VERTEBRAL ARTERIES: No focal stenosis or dissection. Dominant right and smaller left vertebral arteries.    AORTIC ARCH: Classic aortic arch anatomy with no significant stenosis at the origin of the great vessels.      Impression    IMPRESSION:  HEAD MRI:  1.  3.8 x 3 cm (AP x TR) late subacute infarct in the right frontal operculum with cortical laminar necrosis.  2.  Chronic infarct left temporal/occipital lobe.  3.  Mild age-related changes.    HEAD MRA:  No stenosis/occlusion, aneurysm, or high flow vascular malformation.    NECK MRA:  No measurable stenosis or dissection.     MRA Angiogram Neck w/o & w Contrast    Narrative    EXAM: MR BRAIN W/O and W CONTRAST, MRA BRAIN ([place]) W/O CONTRAST, MRA NECK (CAROT[place]) W/O and W CONTRAST  LOCATION: [facility]  DATE: 6/19/2025    INDICATION:  confusion, difficulty word finding  COMPARISON: CTA head and neck 3/16/2023  CONTRAST: 10 Gadavist  TECHNIQUE:   1) Routine multiplanar multisequence head MRI without and with intravenous contrast.  2) 3D time-of-flight head MRA without intravenous contrast.  3) Neck MRA without and with IV contrast. Stenosis measurements made according to NASCET criteria unless otherwise specified.    FINDINGS:  HEAD MRI:  INTRACRANIAL CONTENTS: 3.8 x 3 cm (AP by TR) late subacute infarct in the right frontal operculum with cortical laminar necrosis. No mass, acute hemorrhage, or extra-axial fluid collections. Scattered nonspecific T2/FLAIR hyperintensities within the   cerebral white matter most consistent with mild chronic microvascular ischemic change. Chronic infarct left temporal/occipital lobe. Normal ventricles and sulci. Normal position of the cerebellar tonsils. No pathologic contrast enhancement.    SELLA: No abnormality accounting for technique.    OSSEOUS STRUCTURES/SOFT TISSUES: Diffusely heterogeneous, but overall benign marrow signal pattern. The major intracranial vascular flow voids are maintained.     ORBITS: No abnormality accounting for technique.     SINUSES/MASTOIDS: No paranasal sinus mucosal disease. No middle ear or mastoid effusion.     HEAD MRA:   ANTERIOR CIRCULATION: No stenosis/occlusion, aneurysm, or high flow vascular malformation. Standard [place] of [place] anatomy.    POSTERIOR CIRCULATION: No stenosis/occlusion, aneurysm, or high flow vascular malformation. Dominant right and smaller left vertebral artery contribute to a normal basilar artery.     NECK MRA:   RIGHT CAROTID: No measurable stenosis or dissection.    LEFT CAROTID: No measurable stenosis or dissection.    VERTEBRAL ARTERIES: No focal stenosis or dissection. Dominant right and smaller left vertebral arteries.    AORTIC ARCH: Classic aortic arch anatomy with no significant stenosis at the origin of the great vessels.      Impression     IMPRESSION:  HEAD MRI:  1.  3.8 x 3 cm (AP x TR) late subacute infarct in the right frontal operculum with cortical laminar necrosis.  2.  Chronic infarct left temporal/occipital lobe.  3.  Mild age-related changes.    HEAD MRA:  No stenosis/occlusion, aneurysm, or high flow vascular malformation.    NECK MRA:  No measurable stenosis or dissection.     Folate   Result Value Ref Range    Folic Acid 12.1 4.6 - 34.8 ng/mL   Troponin T, High Sensitivity   Result Value Ref Range    Troponin T, High Sensitivity <6 <=14 ng/L   Extra Tube    Narrative    The following orders were created for panel order Extra Tube.  Procedure                               Abnormality         Status                     ---------                               -----------         ------                     Extra Purple Top Tube[[id number]]                           Final result                 Please view results for these tests on the individual orders.   Extra Purple Top Tube   Result Value Ref Range    Hold Specimen JIC    CBC with platelets   Result Value Ref Range    WBC Count 4.3 4.0 - 11.0 10e3/uL    RBC Count 4.09 3.80 - 5.20 10e6/uL    Hemoglobin 9.6 (L) 11.7 - 15.7 g/dL    Hematocrit 31.7 (L) 35.0 - 47.0 %    MCV 78 78 - 100 fL    MCH 23.5 (L) 26.5 - 33.0 pg    MCHC 30.3 (L) 31.5 - 36.5 g/dL    RDW 16.1 (H) 10.0 - 15.0 %    Platelet Count 226 150 - 450 10e3/uL   HIV Antigen Antibody Combo Cascade   Result Value Ref Range    HIV Antigen Antibody Combo Nonreactive Nonreactive   Echocardiogram Complete - For age > 60 yrs   Result Value Ref Range    LVEF  55-60%     Narrative    [id number]  [id number]  [id number]^SHANE^BETTY^P     [facility]  Echocardiography Laboratory  [facility] [place] [address]     Name: MARIO HOANG  MRN: [id number]  : 1950  Study Date: 2025 12:56 PM  Age: 75 yrs  Gender: Female  Patient [facility]: [facility]  Reason For Study: CVA  Ordering Physician: SHANE  BETTY HENDERSON  Referring Physician: BETTY LYNN  Performed By: Gianluca Norman     BSA: 1.3 m2  Height: 60 in  Weight: 92 lb  HR: 71  BP: 123/57 mmHg  ______________________________________________________________________________  Procedure  Echocardiogram with two-dimensional, color and spectral Doppler.  ______________________________________________________________________________  Interpretation Summary     Technically difficult imaging  A cardiac source of embolus was not identified.  Left ventricular systolic function is normal.  The visual ejection fraction is 55-60%.  The left ventricle is normal in size.  Doppler interrogation does not demonstrate signficant stenosis or  insufficiency inovlving cardiac valves     No change since 3/16/2023  ______________________________________________________________________________  Left Ventricle  The left ventricle is normal in size. There is normal left ventricular wall  thickness. Left ventricular systolic function is normal. The visual ejection  fraction is 55-60%. Left ventricular diastolic function is normal. Diastolic  Doppler findings (E/E' ratio and/or other parameters) suggest left ventricular  filling pressures are normal. No regional wall motion abnormalities noted.  There is no thrombus seen in the left ventricle.     Right Ventricle  The right ventricle is normal in structure, function and size. There is no  mass or thrombus in the right ventricle.     Atria  Normal left atrial size. Right atrial size is normal. There is no atrial shunt  seen. The left atrial appendage is not well visualized.     Mitral Valve  The mitral valve leaflets appear normal. There is no evidence of stenosis,  fluttering, or prolapse. There is no mitral regurgitation noted. There is no  mitral valve stenosis.     Tricuspid Valve  Normal tricuspid valve. No tricuspid regurgitation. Right ventricular systolic  pressure could not be approximated due to inadequate tricuspid  regurgitation.  There is no tricuspid stenosis.     Aortic Valve  The aortic valve is trileaflet. No aortic regurgitation is present. No aortic  stenosis is present.     Pulmonic Valve  The pulmonic valve is not well visualized.     Vessels  The aortic root is normal size. Normal size ascending aorta. The inferior vena  cava is normal. The pulmonary artery is normal size.     Pericardium  The pericardium appears normal. There is no pleural effusion.     Rhythm  Sinus rhythm was noted.  ______________________________________________________________________________  MMode/2D Measurements & Calculations  Ao root diam: 3.2 cm     LVOT diam: 1.8 cm  LVOT area: 2.7 cm2  Ao root diam index Ht(cm/m): 2.1  Ao root diam index BSA (cm/m2): 2.4  LA Volume (BP): 34.0 ml  LA Volume Index (BP): 25.4 ml/m2  TAPSE: 2.2 cm     Doppler Measurements & Calculations  MV E max mathew: 83.1 cm/sec  MV A max mathew: 72.8 cm/sec  MV E/A: 1.1  MV dec slope: 513.2 cm/sec2  MV dec time: 0.16 sec     Ao V2 max: 118.1 cm/sec  Ao max P.0 mmHg  ALEYDA(V,D): 2.2 cm2  LV V1 max PG: 3.7 mmHg  LV V1 max: 95.5 cm/sec  LV V1 VTI: 21.8 cm  SV(LVOT): 58.0 ml  SI(LVOT): 43.2 ml/m2  AV Mathew Ratio (DI): 0.81  E/E' av.5  Lateral E/e': 7.6  Medial E/e': 11.4     ______________________________________________________________________________  Report approved by: Dr. Chalo Banerjee on 2025 03:00 PM         EEG Video 2-12 hrs Continuous Monitoring    Narrative    VIDEO EEG DATE: 2025     VIDEO EEG LOG: [facility]         2 HR PORTABLE     VIDEO EEG DAY#: 0     VIDEO EEG SOURCE FILE DURATION: 2 HRS     INDICATION: 75 year old who presented with memory loss and confusion.  EEG   to rule out seizures as a cause.      CLINICAL INTERPRETATION  The short term EEG was essentially normal in wakefulness and sleep.  The   EEG was frequently obscured by movement or muscle artifact.         TECHNICAL SUMMARY: This is a video-EEG monitoring study. EEG was recorded    from 23 scalp electrodes placed according to the 10-20 international   system. Additional electrodes were utilized for referencing, artifact   detection, and recording from other cerebral regions. Qualified   technicians attached EEG electrodes, set up the study, monitored and   reviewed EEG recordings, and disconnected EEG electrodes when appropriate.   Video was continuously recorded. Video was reviewed for clinical   correlation and to assist with EEG interpretations.      EEG REPORT  The short-term video EEG recording during wakefulness contains 11 Hz alpha   activity over the posterior head regions.  No additional activity occurred with photic stimulation.      During the recording, the patient fell asleep spontaneously.      The EKG channel was unremarkable.      This report was written and reviewed by Liliya Goldberg MD

## 2025-06-20 NOTE — PLAN OF CARE
Goal Outcome Evaluation:      Plan of Care Reviewed With: patient    Overall Patient Progress: improvingOverall Patient Progress: improving    Outcome Evaluation: discharge to home

## 2025-06-20 NOTE — CONSULTS
"      [facility]    Stroke Consult Note      Chief Complaint: Psychiatric Evaluation and Memory Loss     HPI  Mara Rao is a 75 year old right-handed female who presented to the [facility] ED on 06/19 with memory loss and confusion. Presenting /56. No PTA medications per patient.     Ysabel states that she received an inpatient blood transfusion 2 weeks ago. Then, she was discharged in about 2 days later had increased confusion, and was \"mixing things up, placing things in the wrong place, and missing appointments.\" Her friends had started to notice, and contacted her son.  Ysabel states that she was feeling \"off \", with increased fatigue and generalized weakness, lightheadedness (about to faint) and increased awareness of all the fluids in her body. She denies any falls, LOC, numbness, weakness, vision or speech changes. Then, she had called her son to tell her about her symptoms and preferred to go to the ED. She notes no suicidal ideation or plan. She allows permission to call her son Steve for any questions.     Today, Mara states that her lightheadedness, fatigue, generalized weakness and \"awareness of her internal fluids \"are still present, but improved. She states having the energy to speak with others, and move all her extremities. She often goes off tangent during her conversation, and repeatedly brings up her healthcare directive and DNR status.  She feels as though she is a burden to her family, and does not want her children's father to know about her health.  Mara states that she will deny treatments in front of her children's, but would have preferred to be spoken to privately.     PMH -  history of hereditary hemochromatosis, anemia      Mraa states that she does not check her blood pressure at home.  She has never smoked tobacco or use any recreational drugs. She denies snoring and apneic episodes.  She reports that she lives alone at home and " independent with ADLs.     Evaluation Summarized    MRI/Head CT MRI brain: late subacute infarct in right frontal lobe with cortical laminar necrosis. Chronic left occipital lobe infarct with chronic microvascular ischemic changes.     CT head: Age indeterminate right frontal lobe cortical infarct, likely chronic. Chronic left PCA territory infarct   Intracranial Vasculature MRA brain: No LVO   Cervical Vasculature MRA neck: No stenoses or dissection     Echocardiogram TTE: EF 55 to 60%, no cardiac source of embolus.  Bilateral atria and ventricles are normal in size and function.  Sinus rhythm.   EKG/Telemetry Sinus rhythm, nonspecific ST abnormality    Other Testing EEG: The short-term video EEG recording during wakefulness contains 11 Hz alpha activity over the posterior head regions.  No additional activity occurred with photic stimulation. During the recording, the patient fell asleep spontaneously.      LDL  6/19/2025: 84 mg/dL   A1C  6/19/2025: 5.2 %   Troponin 6/20/2025: <6 ng/L       Impression  1.) Indeterminate aged ischemic strokes of chronic/subacute right frontal and left occipital lobes, unclear etiology     Recommendations   - Neuro checks and vital signs every 4 hours  - Recommend toxic metabolic workup  - Inpatient SBP < 160   - Long term outpatient goal BP is <130/80 to be achieved as outpatient within several weeks. Tighter control associated with improved vascular outcomes; recommend home blood pressure monitoring and keeping a BP log for primary care follow up  - Continue aspirin 81mg daily  - LDL not within goal 40-70; borderline, continue to monitor with PCP  - Hgb A1c within goal <7%   - Encourage Mediterranean diet and daily exercise  - Encourage Smoking Cessation   - Appreciate PT/OT/SLP consults  - Bedside Glucose Monitoring  - Euthermia, Euglycemia   - Education: Reviewed [facility] stroke warning signs    Diagnostic testing  - Continue telemetry while inpatient  - 14 day cardiac event  "monitor (Zio Patch) to be mailed to patient, (not ordered)     DVT prophylaxis:  SCDs    Patient Follow-up    - final recommendation pending work-up    Thank you for this consult. We will continue to follow.     Kathy Leyva PA-C  Vascular Neurology    To page me or covering stroke neurology team member, click here: [facility]  Choose \"On Call\" tab at top, then select \"NEUROLOGY/ALL SITES\" from middle drop-down box, press Enter, then look for \"stroke\" or \"telestroke\" for your site.  _____________________________________________________    Clinically Significant Risk Factors Present on Admission                        # Anemia: based on hgb <11       # Cachexia: Estimated body mass index is 17.58 kg/m  as calculated from the following:    Height as of this encounter: 1.524 m (5').    Weight as of this encounter: 40.8 kg (90 lb).            Past Medical History    No past medical history on file.  Medications   Home Meds  Prior to Admission medications    Not on File       Scheduled Meds  Current Facility-Administered Medications   Medication Dose Route Frequency Provider Last Rate Last Admin    aspirin (ASA) chewable tablet 81 mg  81 mg Oral or Feeding Tube Daily Madhuri Cannon MD        aspirin (ASA) tablet 325 mg  325 mg Oral Once Madhuri Cannon MD        sodium chloride (PF) 0.9% PF flush 3 mL  3 mL Intracatheter Q8H [facility] Madhuri Cannon MD   3 mL at 06/20/25 0610       Infusion Meds  Current Facility-Administered Medications   Medication Dose Route Frequency Provider Last Rate Last Admin       Allergies   No Known Allergies       PHYSICAL EXAMINATION   Temp:  [97.3  F (36.3  C)-98  F (36.7  C)] 98  F (36.7  C)  Pulse:  [69-88] 72  Resp:  [12-18] 16  BP: ()/(43-60) 105/55  SpO2:  [98 %-100 %] 98 %    General Exam  General:  patient lying in bed without any acute distress    HEENT:  normocephalic/atraumatic  Pulmonary:  no respiratory distress    Neuro Exam  Mental Status:  alert, oriented to self, " "age and month, follows commands, speech clear and fluent, naming and repetition normal, able to recall days of the week and \"world\" forwards and backwards  Cranial Nerves:  visual fields intact, EOMI with normal smooth pursuit, facial sensation intact and symmetric, facial movements symmetric, hearing not formally tested but intact to conversation, no dysarthria, tongue protrusion midline  Motor:  no abnormal movements, able to move all limbs spontaneously, no pronator drift, decreased bilateral  strength, strength 3/5 LLE , strength 4/5 RLE   Reflexes:  not tested  Sensory:  light touch sensation intact and symmetric throughout upper and lower extremities, no extinction on double simultaneous stimulation   Coordination:  normal finger-to-nose and heel-to-shin bilaterally without dysmetria  Station/Gait:  deferred    Stroke Scales    NIHSS  1a. Level of Consciousness 0-->Alert, keenly responsive   1b. LOC Questions 0-->Answers both questions correctly   1c. LOC Commands 0-->Performs both tasks correctly   2.   Best Gaze 0-->Normal   3.   Visual 0-->No visual loss   4.   Facial Palsy 0-->Normal symmetrical movements   5a. Motor Arm, Left 0-->No drift, limb holds 90 (or 45) degrees for full 10 secs   5b. Motor Arm, Right 0-->No drift, limb holds 90 (or 45) degrees for full 10 secs   6a. Motor Leg, Left 0-->No drift, leg holds 30 degree position for full 5 secs   6b. Motor Leg, right 0-->No drift, leg holds 30 degree position for full 5 secs   7.   Limb Ataxia 0-->Absent   8.   Sensory 0-->Normal, no sensory loss   9.   Best Language 0-->No aphasia, normal   10. Dysarthria 0-->Normal   11. Extinction and Inattention  0-->No abnormality   Total 0 (06/20/25 1200)       Imaging  I personally reviewed all imaging; relevant findings per HPI.    Labs Data   CBC  Recent Labs   Lab 06/20/25  0419 06/19/25  1159   WBC 4.3 4.9   RBC 4.09 4.53   HGB 9.6* 10.6*   HCT 31.7* 34.8*    228     Basic Metabolic Panel   Recent " Labs   Lab 06/19/25  1159      POTASSIUM 4.5   CHLORIDE 107   CO2 21*   BUN 13.9   CR 0.75   GLC 98   GIUSEPPE 9.1     Liver Panel  Recent Labs   Lab 06/19/25  1159   PROTTOTAL 7.0   ALBUMIN 4.2   BILITOTAL 0.6   ALKPHOS 68   AST 20   ALT 9     INR  No lab results found.        Stroke Consult Data Data   This was a non-emergent, non-telestroke consult.  I have personally spent a total of 60 minutes providing care today, time spent in reviewing medical records and devising the plan as recorded above.

## 2025-06-20 NOTE — PROGRESS NOTES
Speech Language Pathology: Orders received. Chart reviewed and discussed with care team.? Speech Language Pathology not indicated due to no concerns for swallowing/speech reported by patient or RN.? Defer discharge recommendations to care team.? Will complete orders.

## 2025-06-21 ENCOUNTER — APPOINTMENT (OUTPATIENT)
Dept: OCCUPATIONAL THERAPY | Facility: CLINIC | Age: 75
End: 2025-06-21
Attending: HOSPITALIST
Payer: COMMERCIAL

## 2025-06-21 ENCOUNTER — APPOINTMENT (OUTPATIENT)
Dept: PHYSICAL THERAPY | Facility: CLINIC | Age: 75
End: 2025-06-21
Attending: HOSPITALIST
Payer: COMMERCIAL

## 2025-06-21 ENCOUNTER — ORDERS ONLY (AUTO-RELEASED) (OUTPATIENT)
Dept: MEDSURG UNIT | Facility: CLINIC | Age: 75
End: 2025-06-21

## 2025-06-21 DIAGNOSIS — I63.9 ISCHEMIC STROKE (H): ICD-10-CM

## 2025-06-21 LAB
ANION GAP SERPL CALCULATED.3IONS-SCNC: 9 MMOL/L (ref 7–15)
BACTERIA UR CULT: ABNORMAL
BACTERIA UR CULT: ABNORMAL
BUN SERPL-MCNC: 8.9 MG/DL (ref 8–23)
CALCIUM SERPL-MCNC: 9 MG/DL (ref 8.8–10.4)
CHLORIDE SERPL-SCNC: 109 MMOL/L (ref 98–107)
CREAT SERPL-MCNC: 0.74 MG/DL (ref 0.51–0.95)
EGFRCR SERPLBLD CKD-EPI 2021: 85 ML/MIN/1.73M2
ERYTHROCYTE [DISTWIDTH] IN BLOOD BY AUTOMATED COUNT: 16.3 % (ref 10–15)
GLUCOSE BLDC GLUCOMTR-MCNC: 81 MG/DL (ref 70–99)
GLUCOSE BLDC GLUCOMTR-MCNC: 90 MG/DL (ref 70–99)
GLUCOSE SERPL-MCNC: 99 MG/DL (ref 70–99)
HCO3 SERPL-SCNC: 22 MMOL/L (ref 22–29)
HCT VFR BLD AUTO: 34.5 % (ref 35–47)
HGB BLD-MCNC: 10.4 G/DL (ref 11.7–15.7)
MCH RBC QN AUTO: 23.6 PG (ref 26.5–33)
MCHC RBC AUTO-ENTMCNC: 30.1 G/DL (ref 31.5–36.5)
MCV RBC AUTO: 78 FL (ref 78–100)
PLATELET # BLD AUTO: 212 10E3/UL (ref 150–450)
POTASSIUM SERPL-SCNC: 4.5 MMOL/L (ref 3.4–5.3)
RBC # BLD AUTO: 4.41 10E6/UL (ref 3.8–5.2)
SODIUM SERPL-SCNC: 140 MMOL/L (ref 135–145)
WBC # BLD AUTO: 3.9 10E3/UL (ref 4–11)

## 2025-06-21 PROCEDURE — 97535 SELF CARE MNGMENT TRAINING: CPT | Mod: GO

## 2025-06-21 PROCEDURE — 36415 COLL VENOUS BLD VENIPUNCTURE: CPT | Performed by: STUDENT IN AN ORGANIZED HEALTH CARE EDUCATION/TRAINING PROGRAM

## 2025-06-21 PROCEDURE — 99233 SBSQ HOSP IP/OBS HIGH 50: CPT

## 2025-06-21 PROCEDURE — 120N000001 HC R&B MED SURG/OB

## 2025-06-21 PROCEDURE — 97116 GAIT TRAINING THERAPY: CPT | Mod: GP

## 2025-06-21 PROCEDURE — 97530 THERAPEUTIC ACTIVITIES: CPT | Mod: GP

## 2025-06-21 PROCEDURE — 97161 PT EVAL LOW COMPLEX 20 MIN: CPT | Mod: GP

## 2025-06-21 PROCEDURE — 97530 THERAPEUTIC ACTIVITIES: CPT | Mod: GO

## 2025-06-21 PROCEDURE — 82310 ASSAY OF CALCIUM: CPT | Performed by: STUDENT IN AN ORGANIZED HEALTH CARE EDUCATION/TRAINING PROGRAM

## 2025-06-21 PROCEDURE — 97165 OT EVAL LOW COMPLEX 30 MIN: CPT | Mod: GO

## 2025-06-21 PROCEDURE — 85027 COMPLETE CBC AUTOMATED: CPT | Performed by: STUDENT IN AN ORGANIZED HEALTH CARE EDUCATION/TRAINING PROGRAM

## 2025-06-21 RX ORDER — CEFTRIAXONE 1 G/1
1 INJECTION, POWDER, FOR SOLUTION INTRAMUSCULAR; INTRAVENOUS EVERY 24 HOURS
Status: DISCONTINUED | OUTPATIENT
Start: 2025-06-21 | End: 2025-06-23 | Stop reason: HOSPADM

## 2025-06-21 ASSESSMENT — ACTIVITIES OF DAILY LIVING (ADL)
ADLS_ACUITY_SCORE: 32
ADLS_ACUITY_SCORE: 31
ADLS_ACUITY_SCORE: 34
ADLS_ACUITY_SCORE: 34
ADLS_ACUITY_SCORE: 32
ADLS_ACUITY_SCORE: 34
ADLS_ACUITY_SCORE: 32
ADLS_ACUITY_SCORE: 31
ADLS_ACUITY_SCORE: 32
ADLS_ACUITY_SCORE: 34
ADLS_ACUITY_SCORE: 32
ADLS_ACUITY_SCORE: 34
ADLS_ACUITY_SCORE: 34
ADLS_ACUITY_SCORE: 32
ADLS_ACUITY_SCORE: 34
ADLS_ACUITY_SCORE: 34
ADLS_ACUITY_SCORE: 31
ADLS_ACUITY_SCORE: 34
ADLS_ACUITY_SCORE: 32
ADLS_ACUITY_SCORE: 32
ADLS_ACUITY_SCORE: 34
ADLS_ACUITY_SCORE: 32
ADLS_ACUITY_SCORE: 31

## 2025-06-21 NOTE — PROGRESS NOTES
Chart reviewed. Agree with assessment that her history of hemochromatosis and treatment with phlebotomy is likely unrelated to her admission diagnosis. Serum iron 25, TIBC 309 and iron saturation index 8% consistent with mild iron deficiency. No role for phlebotomy. Will continue to follow

## 2025-06-21 NOTE — PROVIDER NOTIFICATION
Writer paged Dr. Graham. See  lab results showing,  10,000-50,000 CFU/mL Streptococcus agalactiae (Group B Streptococcus) Please advise.

## 2025-06-21 NOTE — CONSULTS
"CLINICAL NUTRITION SERVICES - ASSESSMENT NOTE        Recommendations:  No intervention at this time - family to provide meals per pt's preference       REASON FOR ASSESSMENT  Provider order - abe, acute on chronic malnutrition    Nutrition Admission Screen:  Have you recently lost weight without trying? \"NO\"  Have you been eating poorly because of a decreased appetite? \"NO\"      INFORMATION OBTAINED  Assessed patient in room.    NUTRITION HISTORY  Chart reviewed  Per ED provider - \"Mara denies chest pain, nausea, vomiting, fever, cough, bloody stools, loss of appetite, urinary symptoms or cold/flu like symptoms.\"      Visited with pt this afternoon - was very polite and appreciative of visit   - \"I have always been thin.  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 and the joke was that half of me was left in there\"  - pt has been vegan for the past 50 yrs  - does not eat anything out of a package  - buys organic food  - practices holistic nursing  - has not had a decrease in her po intake PTA    CURRENT NUTRITION ORDERS  Diet: Room Service with Assist    Pt does not have an active diet order      CURRENT INTAKE/TOLERANCE  Pt does not want to order from the menu - \"in the politest way, I will not eat any of the food from here\"  All meals being brought in by family   Lunch brought in by son - lakisha stovall, brown rice  Pt has organic chips and a few other snacks in her room    LABS  Nutrition-relevant labs: Reviewed    MEDICATIONS  Nutrition-relevant medications: Reviewed    ANTHROPOMETRICS  Height: 152.4 cm (5' 0\")  Most Recent Weight: 41.3 kg (91 lb) (06/21/25)  IBW: 45.4 kg  BMI: 17.77   Weight History:   Wt appears stable  Wt Readings from Last 10 Encounters:   06/21/25 41.3 kg (91 lb)   03/15/23 40.8 kg (90 lb)       Dosing Weight: 41.3 kg, based on actual wt (6/21)    ASSESSED NUTRITION NEEDS  Estimated Energy Needs: 5271-2537 kcals/day (30 - 35 kcals/kg)  Justification: Underweight  Estimated Protein Needs: 50-60 grams " protein/day (1.2 - 1.5 grams of pro/kg)  Justification: Maintenance  Estimated Fluid Needs: 5452-7090 mL/day (1 mL/kcal)  Justification: Maintenance    SYSTEM AND PHYSICAL FINDINGS    Presents to the emergency department with a complaint of increased stress and some memory loss.   6/19: Head CT - Ischemic strokes of indeterminate etiology & indeterminate age    GI symptoms: Reviewed    Skin/wounds: Reviewed    MALNUTRITION  % Intake: No decreased intake noted  % Weight Loss: None noted  Subcutaneous Fat Loss: low fat stores - no acute loss  Muscle Loss: chronic low muscle mass - no acute loss  Fluid Accumulation/Edema: None noted  Malnutrition Diagnosis: Patient does not meet two of the established criteria necessary for diagnosing malnutrition  Malnutrition Present on Admission: No    NUTRITION DIAGNOSIS  No nutrition diagnosis at this time     INTERVENTIONS  No intervention at this time - family to provide meals per pt's preference    GOALS  Pt to tolerate po intake     MONITORING/EVALUATION  Progress toward goals will be monitored and evaluated per policy.

## 2025-06-21 NOTE — PLAN OF CARE
Goal Outcome Evaluation:         Pt here with acute metabolic encephalopathy (improving), Late subacute R frontal CVA, Chronic L temporal and L occipital CVA . A&O x4. Neuros intact ex. Forgetful and gen weakness. VSS. Tele NSR. reg diet, thin liquids. Takes pills whole with water. Up with A1 GB. Denies pain. Pt scoring green on the Aggression Stop Light Tool. Plan to possibly discharge home.

## 2025-06-21 NOTE — PLAN OF CARE
Goal Outcome Evaluation:    Plan of Care Reviewed With: patient, child    Overall Patient Progress: no changeOverall Patient Progress: no change    Pt here with acute toxic metabolic encephalopathy, unclear etiology, found a late subacute R frontal infarct, has a chronic L temporal, L occipital lobe CVA. Hx hereditary hemochromatosis, post-phlebotomy anemia w/ severe fatigue. A&O x4, forgetful, tearful, stated depress feels, Spiritual Health consulted, SH spoke to patient on phone, patient requested to see SH in person on Monday also. VSS, on RA. LS clear. Tele NSR. C/o generalized pain, refused pain med. CMS and Neuro's intact. Up SBA w/ GB. Showered today. Cont of B&B. Voiding adequately. See UC results, IV Rocephin ordered, patient currently refusing, see writers previous note. +BS, last BM 6/19 per patient.  Reg diet, very poor appetite, Nutrition consult ordered, takes pills whole water. Pt scoring green on Aggression Stop Light Tool. SonJarett has been updated by Dr. Shraddha Braun. Recommending 24/7 care vs TCU at time of discharge.

## 2025-06-21 NOTE — PROGRESS NOTES
Writer paged Dr Llanes. Patient currently refusing IV Rocephin, ordered to treat UTI. Patient has a Holistic approach to treatment her medical issues, has a moral issue with taking medication.

## 2025-06-21 NOTE — PROGRESS NOTES
[facility]    Medicine Progress Note - Hospitalist Service    Date of Admission:  6/19/2025    Assessment & Plan   Mara Rao is a 75 year old female with history of hereditary hemochromatosis who was admitted on 6/19/2025 with confusion and fatigue of unclear etiology.      Acute toxic metabolic encephalopathy, unclear etiology  Late subacute R frontal infarct  Chronic L temporal, L occipital lobe CVA  Patient presenting with progressive confusion, fatigue since her most recent phlebotomy in May 2025.  She was brought to the ED after a friend noticed worsening confusion.  Patient's son further reports worsening forgetfulness.  No electrolyte abnormalities, hypoxia, trauma or injury, infectious signs or symptoms, focal deficits. B12, folate, HIV normal. Stroke code called on admission with workup notable late subacute R frontal infarct and chronic L temporal, L occipital infarcts. TTE stable from prior. Discussed with Stroke Neurology and do not feel subacute R infarct is driving mental status changes. Overall unclear etiology of AMS, possible ?vascular dementia vs infectious vs metabolic vs seizure vs post-phlebotomy vs other.  -Continue ASA  -Follow-up EEG, UA, TSH, TSH: 1.7, UA unremarkable  -Neurochecks q4h  -Stroke Neurology following  -PT, OT, SW consulted  -Delirium precautions     Hereditary hemochromatosis  Post-phlebotomy anemia w/ severe fatigue  Patient with history of post-phlebotomy anemia. Last phlebotomy in May 2025. Unclear recent hemoglobin baseline, hgb ~10 this admission. Initial iron studies with likely component of iron deficiency.  -Daily CBC, follow-up ferritin  -Heme/Onc consulted:  Agree with assessment that her history of hemochromatosis and treatment with phlebotomy is likely unrelated to her admission diagnosis. Serum iron 25, TIBC 309 and iron saturation index 8% consistent with mild iron deficiency. No role for phlebotomy. Will continue to follow                         # Cachexia: Estimated body mass index is 17.58 kg/m  as calculated from the following:    Height as of this encounter: 1.524 m (5').    Weight as of this encounter: 40.8 kg (90 lb)., PRESENT ON  --- Nutrition consulted        Diet: Regular Diet Adult  Room Service    DVT Prophylaxis: Pneumatic Compression Devices  Martinez Catheter: Not present  Lines: None     Cardiac Monitoring: ACTIVE order. Indication: Stroke, acute (48 hours)  Code Status: No CPR- Do NOT Intubate      Clinically Significant Risk Factors        Social Drivers of Health            Disposition Plan     Medically Ready for Discharge: Anticipated in 2-4 Days        Tello Llanes MD  Hospitalist Service  [facility]  Securely message with [employer] (more info)  Text page via [employer] Paging/Directory   ______________________________________________________________________    Interval History   No acute events overnight, denies chest pain, SOB, urinary or bowel complaints    Physical Exam   Vital Signs: Temp: 97.4  F (36.3  C) Temp src: Oral BP: 98/50 Pulse: 70   Resp: 18 SpO2: 97 % O2 Device: None (Room air)    Weight: 90 lbs 0 oz    General Appearance: Not in distress  Respiratory: Clear to auscultation bilaterally, no added breath sounds  Cardiovascular: S1 and S2 well heard, no murmur or gallop  GI: Nontender, nondistended, positive bowel sounds  Skin: No rash  CNS: Alert and oriented x 3, nonfocal      Medical Decision Making       55 MINUTES SPENT BY ME on the date of service doing chart review, history, exam, documentation & further activities per the note.      Data     I have personally reviewed the following data over the past 24 hrs:    TSH: 1.69 T4: N/A A1C: N/A       Imaging results reviewed over the past 24 hrs:   Recent Results (from the past 24 hours)   Echocardiogram Complete - For age > 60 yrs   Result Value    LVEF  55-60%    Narrative     [id number]  [id number]  [id number]  [id number]^[id number]^BETTY^P     [facility]  Echocardiography Laboratory  [address] [place] [address]     Name: MARIO HOANG  MRN: [id number]  : 1950  Study Date: 2025 12:56 PM  Age: 75 yrs  Gender: Female  Patient Location: [facility]  Reason For Study: CVA  Ordering Physician: BETTY LYNN  Referring Physician: BETTY LYNN  Performed By: Gianluca Norman     BSA: 1.3 m2  Height: 60 in  Weight: 92 lb  HR: 71  BP: 123/57 mmHg  ______________________________________________________________________________  Procedure  Echocardiogram with two-dimensional, color and spectral Doppler.  ______________________________________________________________________________  Interpretation Summary     Technically difficult imaging  A cardiac source of embolus was not identified.  Left ventricular systolic function is normal.  The visual ejection fraction is 55-60%.  The left ventricle is normal in size.  Doppler interrogation does not demonstrate signficant stenosis or  insufficiency inovlving cardiac valves     No change since 3/16/2023  ______________________________________________________________________________  Left Ventricle  The left ventricle is normal in size. There is normal left ventricular wall  thickness. Left ventricular systolic function is normal. The visual ejection  fraction is 55-60%. Left ventricular diastolic function is normal. Diastolic  Doppler findings (E/E' ratio and/or other parameters) suggest left ventricular  filling pressures are normal. No regional wall motion abnormalities noted.  There is no thrombus seen in the left ventricle.     Right Ventricle  The right ventricle is normal in structure, function and size. There is no  mass or thrombus in the right ventricle.     Atria  Normal left atrial size. Right atrial size is normal. There is no atrial shunt  seen. The left atrial appendage is not well visualized.      Mitral Valve  The mitral valve leaflets appear normal. There is no evidence of stenosis,  fluttering, or prolapse. There is no mitral regurgitation noted. There is no  mitral valve stenosis.     Tricuspid Valve  Normal tricuspid valve. No tricuspid regurgitation. Right ventricular systolic  pressure could not be approximated due to inadequate tricuspid regurgitation.  There is no tricuspid stenosis.     Aortic Valve  The aortic valve is trileaflet. No aortic regurgitation is present. No aortic  stenosis is present.     Pulmonic Valve  The pulmonic valve is not well visualized.     Vessels  The aortic root is normal size. Normal size ascending aorta. The inferior vena  cava is normal. The pulmonary artery is normal size.     Pericardium  The pericardium appears normal. There is no pleural effusion.     Rhythm  Sinus rhythm was noted.  ______________________________________________________________________________  MMode/2D Measurements & Calculations  Ao root diam: 3.2 cm     LVOT diam: 1.8 cm  LVOT area: 2.7 cm2  Ao root diam index Ht(cm/m): 2.1  Ao root diam index BSA (cm/m2): 2.4  LA Volume (BP): 34.0 ml  LA Volume Index (BP): 25.4 ml/m2  TAPSE: 2.2 cm     Doppler Measurements & Calculations  MV E max mathew: 83.1 cm/sec  MV A max mathew: 72.8 cm/sec  MV E/A: 1.1  MV dec slope: 513.2 cm/sec2  MV dec time: 0.16 sec     Ao V2 max: 118.1 cm/sec  Ao max P.0 mmHg  ALEYDA(V,D): 2.2 cm2  LV V1 max PG: 3.7 mmHg  LV V1 max: 95.5 cm/sec  LV V1 VTI: 21.8 cm  SV(LVOT): 58.0 ml  SI(LVOT): 43.2 ml/m2  AV Mathew Ratio (DI): 0.81  E/E' av.5  Lateral E/e': 7.6  Medial E/e': 11.4     ______________________________________________________________________________  Report approved by: Dr. Chalo Banerjee on 2025 03:00 PM

## 2025-06-21 NOTE — PROGRESS NOTES
06/21/25 1500   Appointment Info   Signing Clinician's Name / Credentials (PT) Carlotta Horn, DPLOGAN   Living Environment   People in Home alone   Current Living Arrangements condominium   Home Accessibility no concerns   Transportation Anticipated car, drives self   Living Environment Comments Alone in condo, sees son twice a month   Self-Care   Usual Activity Tolerance excellent   Current Activity Tolerance moderate   Regular Exercise No   Equipment Currently Used at Home none   Fall history within last six months no   Activity/Exercise/Self-Care Comment IND at baseline, works as HC RN independently. Likes to walk around the [facility] daily.   General Information   Onset of Illness/Injury or Date of Surgery 06/19/25   Referring Physician Madhuri Cannon MD   Patient/Family Therapy Goals Statement (PT) go home   Pertinent History of Current Problem (include personal factors and/or comorbidities that impact the POC) Mara Rao is a 75 year old female with history of hereditary hemochromatosis who was admitted on 6/19/2025 with confusion and fatigue of unclear etiology. Found to have UTI, potential met encephelopy   Existing Precautions/Restrictions fall   Cognition   Affect/Mental Status (Cognition) confused   Orientation Status (Cognition) oriented to;person;place   Follows Commands (Cognition) follows one-step commands;repetition of directions required   Cognitive Status Comments Attempted to mop up puddle of water on the bathroom floor from shower with 1 square of toilet paper, easily redirected.   Pain Assessment   Patient Currently in Pain No   Integumentary/Edema   Integumentary/Edema Comments Pt wearing blue surgical cap tied around her hair. cachexic   Posture    Posture Not impaired   Range of Motion (ROM)   Range of Motion ROM is WFL   Strength (Manual Muscle Testing)   Strength (Manual Muscle Testing) Deficits observed during functional mobility   Strength Comments Global deconditioning, cachexic    Bed Mobility   Comment, (Bed Mobility) NT   Transfers   Comment, (Transfers) SBA w/ no AD sit<>stand   Gait/Stairs (Locomotion)   Comment, (Gait/Stairs) CGA progressing to SBA no AD, no dizziness with head hurns, mild postural sway.   Balance   Balance Comments No overt LOB with dynamic tasks no AD. SBA no AD.   Clinical Impression   Criteria for Skilled Therapeutic Intervention Yes, treatment indicated   PT Diagnosis (PT) impaired gait   Influenced by the following impairments weakness, pain   Functional limitations due to impairments fall risk   Clinical Presentation (PT Evaluation Complexity) stable   Clinical Presentation Rationale [facility]   Clinical Decision Making (Complexity) low complexity   Planned Therapy Interventions (PT) balance training;bed mobility training;gait training;home exercise program;neuromuscular re-education;patient/family education;ROM (range of motion);stair training;strengthening;stretching;transfer training   Risk & Benefits of therapy have been explained evaluation/treatment results reviewed;care plan/treatment goals reviewed;risks/benefits reviewed;current/potential barriers reviewed;participants voiced agreement with care plan;participants included;patient   PT Total Evaluation Time   PT Eval, Low Complexity Minutes (87970) 12   Physical Therapy Goals   PT Frequency 5x/week   PT Predicted Duration/Target Date for Goal Attainment 06/28/25   PT Goals Transfers;Bed Mobility;Gait   PT: Bed Mobility Independent;Supine to/from sit;Rolling;Bridging   PT: Transfers Independent;Sit to/from stand;Bed to/from chair;Assistive device   PT: Gait Independent;Assistive device;150 feet   Interventions   Interventions Quick Adds Gait Training;Therapeutic Activity   Therapeutic Activity   Therapeutic Activities: dynamic activities to improve functional performance Minutes (10325) 10   Symptoms Noted During/After Treatment None   Treatment Detail/Skilled Intervention Pt in recliner, alert to self and  "location. Somewhat tangential throughout, perseverating on SLUMS score but unable to recall what number it actuallywas. Pt requesting to toilet, SBA transfers and amb 10ft to bathroom, pt attempting to mop up large puddle from shower with 1 square of toilet paper, easily redirected. IND josephine cares and hygiene. After amb,r eturned to recliner, discussed more supportive living environment. Pt open to TCU, had not considered halfway. 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   Gait Training   Gait Training Minutes (86327) 8   Symptoms Noted During/After Treatment (Gait Training) none   Treatment Detail/Skilled Intervention Gait training: no AD, amb 300ft CGA progressing to SBA. Cued for path finding, trialed head turns, changes in gait speeds which pt completed SBA no overt LOB. Trialed stairs x 4 with rails, x4 without, compeltes SBA reciprical pattern.   PT Discharge Planning   PT Plan Dynamic balance tasks, dual tasking with gait   PT Discharge Recommendation (DC Rec) Transitional Care Facility;home with assist  (Assisted Living)   PT Rationale for DC Rec Pt ambulating 300 ft SBA no AD, completed stairs x8 with no railings SBA appears most limited by cognition. Pt will require 24/7 supervision for safety, but lives alone and does not have this level of support. Rec transfer to higher level of care such as halfway. Could consider TCU though pt does not have significant strength deficits, could likely amb IND if not for impaired cognition.   PT Brief overview of current status SBA  (Goals of therapy will be to address safe mobility and make recs for d/c to next level of care. Pt and RN will continue to follow all falls risk precautions as documented by RN staff while hospitalized)   PT Total Distance Amb During Session (feet) 300     "

## 2025-06-21 NOTE — PROGRESS NOTES
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   General Information   Onset of Illness/Injury or Date of Surgery 06/19/25   Referring Physician Madhuri Cannon MD   Additional Occupational Profile Info/Pertinent History of Current Problem Mara Rao is a 75 year old female with history of hereditary hemochromatosis who was admitted on 6/19/2025 with confusion and fatigue of unclear etiology.      Acute toxic metabolic encephalopathy, unclear etiology  Late subacute R frontal infarct  Chronic L temporal, L occipital lobe CVA  Patient presenting with progressive confusion, fatigue since her most recent phlebotomy in May 2025.  She was brought to the ED after a friend noticed worsening confusion.  Patient's son further reports worsening forgetfulness.  No electrolyte abnormalities, hypoxia, trauma or injury, infectious signs or symptoms, focal deficits. B12, folate, HIV normal. Stroke code called on admission with workup notable late subacute R frontal infarct and chronic L temporal, L occipital infarcts. TTE stable from prior. Discussed with Stroke Neurology and do not feel subacute R infarct is driving mental status changes. Overall unclear etiology of AMS, possible ?vascular dementia vs infectious vs metabolic vs seizure vs post-phlebotomy vs other.   Existing Precautions/Restrictions fall   Limitations/Impairments safety/cognitive   Cognitive Status Examination   Orientation Status orientation to person, place and time    Affect/Mental Status (Cognitive) confused   Follows Commands follows one-step commands;over 90% accuracy   Cognitive Screens/Assessments   Cognitive Assessments Completed [facility] Mental Status Exam ([facility]):  Total Score out of    [facility] Norms 1-20 equals dementia   [facility] Domains assessed: orientation, memory, attention, executive functions   [facility] Interpretation The UMS ([facility] Mental Status Exam) is a 30-point standardized cognitive screen used to identify the presence of cognitive deficits and/or to identify a change in cognition over time.  This screen assesses cognitive abilities in various domains.  [email or web address])     Patient's performance was as follows:    Orientation and Attention: 2/3  Memory: Delayed Recall with Interference: 1/5  Calculation and Registration: 0/3  Category Naming with Time Contraint: 3/3  Registration and Digit Span: 2/2  Clock drawin/4  Visual Spatial: 2/2  Story Recall with Executive Function: 28     Total Score: 1430     Score Interpretation: Score places patient in the dementia category.  Patient demonstrates deficits in the following areas: attention, delayed recall, executive functions, clock drawing, and orientation.  Please note that this examination is used to screen individuals to look for the presence of cognitive deficits and to identify changes in cognition over time.  This is not a diagnosis.  This examination can be followed by further cognitive assessments if appropriate and deemed necessary.   Visual Perception   Visual Impairment/Limitations WFL   Pain Assessment   Patient Currently in Pain No   Posture   Posture protracted shoulders   Range of Motion Comprehensive   General Range of Motion no range of motion deficits identified   Strength Comprehensive (MMT)   General Manual Muscle Testing (MMT) Assessment upper extremity strength deficits identified;lower extremity strength deficits identified   Comment, General  Manual Muscle Testing (MMT) Assessment generalized weakness, very fraile   Coordination   Upper Extremity Coordination No deficits were identified   Bed Mobility   Bed Mobility supine-sit   Supine-Sit [place] (Bed Mobility) independent   Transfers   Transfers sit-stand transfer   Transfer Comments CGA   Activities of Daily Living   BADL Assessment/Intervention lower body dressing   Lower Body Dressing Assessment/Training   [place] Level (Lower Body Dressing) don;doff;socks;supervision   Clinical Impression   Criteria for Skilled Therapeutic Interventions Met (OT) Yes, treatment indicated   OT Diagnosis impaired functional mobility and self cares   OT Problem List-Impairments impacting ADL problems related to;activity tolerance impaired;balance;cognition;mobility;strength   Assessment of Occupational Performance 1-3 Performance Deficits   Identified Performance Deficits home management, cognition, functional mobility   Planned Therapy Interventions (OT) ADL retraining;cognition;transfer training;progressive activity/exercise   Clinical Decision Making Complexity (OT) problem focused assessment/low complexity   Risk & Benefits of therapy have been explained evaluation/treatment results reviewed;care plan/treatment goals reviewed   OT Total Evaluation Time   OT Eval, Low Complexity Minutes (77375) 12   OT Goals   Therapy Frequency (OT) Daily   OT Predicted Duration/Target Date for Goal Attainment 07/05/25   OT Goals Hygiene/Grooming;Toilet Transfer/Toileting;Home Management;Cognition   OT: Hygiene/Grooming independent   OT: Toilet Transfer/Toileting Independent   OT: Home Management Independent   OT: Cognitive Patient/caregiver will verbalize understanding of cognitive assessment results/recommendations as needed for safe discharge planning   Interventions   Interventions Quick Adds Self-Care/Home Management;Therapeutic Activity   Self-Care/Home Management   Self-Care/Home Mgmt/ADL, Compensatory, Meal Prep  "Minutes (59947) 19   Treatment Detail/Skilled Intervention Pt complete SLUMS assessment, see above for scoring details. Reviewed all the results with patient, her demeaner was distraugth and was upset by restuls. But seemed understanding stating, \"something went wrong up there (pointing to her head\"\". Discussed what this means for support post-hospital. Pt seems to be aware and receptive. Son present also, supportive. Discussed further testing with neuropsych recommended and 24/7 assist at d/c. Pt unsure if she is able to get this. Pt up in chair and all needs met, alarm on.   Therapeutic Activities   Therapeutic Activity Minutes (44100) 9   Symptoms noted during/after treatment none   Treatment Detail/Skilled Intervention Ambulates in room and hallway with SBA-CGA. Pt very fraile and reports feeling \"unsteady\" from being in bed for 2 days. Slow and hesitant walking. Ends in chair in room at end of session.   OT Discharge Planning   OT Plan cognitive interventions, med management (pt is a homecare nurse but does not take any medicine at baseline herself), assess sequencing ADLs   OT Discharge Recommendation (DC Rec) home with assist;home with outpatient occupational therapy;Transitional Care Facility   OT Rationale for DC Rec Pt lives alone at baseline, still working as a homecare nurse. Slow progressive decline over last 1-2 years with memory per son report, significant worse over past week. Today scored a 14/30 on SLUMS, at this time recommend 24/7 supervision or TCU as pt would likely be unsafe to drive, cooking or complete IADLs. If cognition does not improve, likely will need more supportive living environment.   OT Brief overview of current status Goals of therapy will be to address safe mobility and make recs for d/c to next level of care. Pt and RN will continue to follow all falls risk precautions as documented by RN staff while hospitalized   OT Total Distance Amb During Session (feet) 150     "

## 2025-06-21 NOTE — PROGRESS NOTES
SPIRITUAL HEALTH SERVICES Consult Note  [facility]  Neuroscience    Reason for visit or referral: [facility] consult request for emotional and spiritual support (routine).    Spoke with pt by telephone per request by bedside nurse.    Pt spoke at length about grief related to loss of her [employer] community 5 years ago and trauma related to childhood and adult experiences.    Pt states that despite estrangement from the [employer] community she was part of for 45 years she continues to pursue spiritual life through meditation and other individual activities, but remains deeply saddened by loss of her Buddhist community.    Plan: consult remains open; pt would like to be seen by  on 01/23 if she remains in hospital.    Jonnie Conteh  Associate   [facility] Phone Line [phone]  Spiritual Health Pager [phone]    [facility] available 24/7 for emergent requests/referrals, either by paging the on-call  or by entering an ASAP/STAT consult in [facility], which will also page the on-call .

## 2025-06-22 ENCOUNTER — ORDERS ONLY (AUTO-RELEASED) (OUTPATIENT)
Dept: MEDSURG UNIT | Facility: CLINIC | Age: 75
End: 2025-06-22
Payer: COMMERCIAL

## 2025-06-22 DIAGNOSIS — I63.9 ISCHEMIC STROKE (H): ICD-10-CM

## 2025-06-22 LAB
ANION GAP SERPL CALCULATED.3IONS-SCNC: 9 MMOL/L (ref 7–15)
BUN SERPL-MCNC: 10.4 MG/DL (ref 8–23)
CALCIUM SERPL-MCNC: 8.7 MG/DL (ref 8.8–10.4)
CHLORIDE SERPL-SCNC: 107 MMOL/L (ref 98–107)
CREAT SERPL-MCNC: 0.75 MG/DL (ref 0.51–0.95)
EGFRCR SERPLBLD CKD-EPI 2021: 84 ML/MIN/1.73M2
ERYTHROCYTE [DISTWIDTH] IN BLOOD BY AUTOMATED COUNT: 16.5 % (ref 10–15)
GLUCOSE SERPL-MCNC: 95 MG/DL (ref 70–99)
HCO3 SERPL-SCNC: 23 MMOL/L (ref 22–29)
HCT VFR BLD AUTO: 32.8 % (ref 35–47)
HGB BLD-MCNC: 9.9 G/DL (ref 11.7–15.7)
MCH RBC QN AUTO: 23.3 PG (ref 26.5–33)
MCHC RBC AUTO-ENTMCNC: 30.2 G/DL (ref 31.5–36.5)
MCV RBC AUTO: 77 FL (ref 78–100)
PLATELET # BLD AUTO: 192 10E3/UL (ref 150–450)
POTASSIUM SERPL-SCNC: 4.1 MMOL/L (ref 3.4–5.3)
RBC # BLD AUTO: 4.24 10E6/UL (ref 3.8–5.2)
SODIUM SERPL-SCNC: 139 MMOL/L (ref 135–145)
WBC # BLD AUTO: 4.6 10E3/UL (ref 4–11)

## 2025-06-22 PROCEDURE — 80048 BASIC METABOLIC PNL TOTAL CA: CPT

## 2025-06-22 PROCEDURE — 85014 HEMATOCRIT: CPT

## 2025-06-22 PROCEDURE — 99232 SBSQ HOSP IP/OBS MODERATE 35: CPT

## 2025-06-22 PROCEDURE — 36415 COLL VENOUS BLD VENIPUNCTURE: CPT

## 2025-06-22 PROCEDURE — 120N000001 HC R&B MED SURG/OB

## 2025-06-22 ASSESSMENT — ACTIVITIES OF DAILY LIVING (ADL)
ADLS_ACUITY_SCORE: 34

## 2025-06-22 NOTE — PROGRESS NOTES
Brief Stroke Note:     Brit's stroke workup is completed with ideology of chronic strokes likely to be ESUS. Plan to continue daily aspirin 81 mg. Stroke outpatient follow-up and Zio patch are ordered. Please see my consult note on 6/20 for further recommendations. If there are any concerns or questions, please contact stroke team no further stroke workup recommended. Stroke team is signing off.       Thank you,        Kathy Leyva PA-C  Vascular Neurology

## 2025-06-22 NOTE — PLAN OF CARE
Goal Outcome Evaluation:    Plan of Care Reviewed With: patient, child    Overall Patient Progress: improvingOverall Patient Progress: improving    Pt here with acute toxic metabolic encephalopathy, unclear etiology. A&O x4, forgetful. Spiritual Health consulted, SH spoke to patient on phone yesterday, patient requested to see SH in person on Monday also. VSS, on RA. LS clear. Tele NSR. CMS and Neuro's intact. Up SBA w/ GB. Cont of B&B. +UTI, patient has refused IV Rocephin, along with oral meds, MD aware. +BS, last BM 6/19 per patient. Reg diet, she has a Vegan for years, and only eats food family brings in for her, see Nutrition's note. Pt scoring green on Aggression Stop Light Tool. . Recommending TCU at time of discharge.

## 2025-06-22 NOTE — PLAN OF CARE
Goal Outcome Evaluation:      Plan of Care Reviewed With: patient      DATE & SHIFT: 06/22/2025  PRIMARY Concern: Ischemic stroke  SAFETY RISK Concerns, fall risk  Aggression Tool Color: green  Isolation/Type: NA  Tests/Procedures for NEXT shift: NA  Consults? (Pending/following, signed-off?) NA  Where is patient from? (Home, TCU, etc.): home  Mobility Level/Assist Equipment: up with SBA, use walker  Other Important info for NEXT shift: patient refuse to take medication for UTI and other treatments due to her stong spiritual concerns, she stated she prefers to go home and tacking care of herself, family visited on this shift brought food ate well, socialized ok, writer educate client the precaution of  refuse medication and treatments while she is in hospital.patient aware of the consequences. Plans   Anticipated DC date & active delays: TBD

## 2025-06-22 NOTE — PROGRESS NOTES
[facility]    Medicine Progress Note - Hospitalist Service    Date of Admission:  6/19/2025    Assessment & Plan   Mara Rao is a 75 year old female with history of hereditary hemochromatosis who was admitted on 6/19/2025 with confusion and fatigue of unclear etiology.    Changes today  -- SLUM score 14/30, updated son Jarett Rao at length, patient would benefit from placement  - Care coordination/care management consulted to assist with placement  - Patient adamantly refused treatment for UTI, no signs of severe sepsis, will continue to monitor, son updated on this.  Will reapproach if she would agree    Acute toxic metabolic encephalopathy, unclear etiology  Late subacute R frontal infarct  Chronic L temporal, L occipital lobe CVA  Patient presenting with progressive confusion, fatigue since her most recent phlebotomy in May 2025.  She was brought to the ED after a friend noticed worsening confusion.  Patient's son further reports worsening forgetfulness.  No electrolyte abnormalities, hypoxia, trauma or injury, infectious signs or symptoms, focal deficits. B12, folate, HIV normal. Stroke code called on admission with workup notable late subacute R frontal infarct and chronic L temporal, L occipital infarcts. TTE stable from prior. Discussed with Stroke Neurology and do not feel subacute R infarct is driving mental status changes. Overall unclear etiology of AMS, possible ?vascular dementia vs infectious vs metabolic vs seizure vs post-phlebotomy vs other.  -Continue ASA  -Follow-up EEG, UA, TSH, TSH: 1.7, UA unremarkable  -Neurochecks q4h  -Stroke Neurology following  -PT, OT, SW consulted  -Delirium precautions     Hereditary hemochromatosis  Post-phlebotomy anemia w/ severe fatigue  Patient with history of post-phlebotomy anemia. Last phlebotomy in May 2025. Unclear recent hemoglobin baseline, hgb ~10 this admission. Initial iron studies with likely component of iron  deficiency.  -Daily CBC, follow-up ferritin  -Heme/Onc consulted:  Agree with assessment that her history of hemochromatosis and treatment with phlebotomy is likely unrelated to her admission diagnosis. Serum iron 25, TIBC 309 and iron saturation index 8% consistent with mild iron deficiency. No role for phlebotomy. Will continue to follow     UTI  --- Started ceftriaxone                       # Cachexia: Estimated body mass index is 17.58 kg/m  as calculated from the following:    Height as of this encounter: 1.524 m (5').    Weight as of this encounter: 40.8 kg (90 lb)., PRESENT ON  --- Nutrition consulted        Diet: Regular Diet Adult  Room Service    DVT Prophylaxis: Pneumatic Compression Devices  Martinez Catheter: Not present  Lines: None     Cardiac Monitoring: ACTIVE order. Indication: Stroke, acute (48 hours)  Code Status: No CPR- Do NOT Intubate      Clinically Significant Risk Factors        Social Drivers of Health            Disposition Plan     Medically Ready for Discharge: Anticipated in 2-4 Days to likely TCU/memory care depending on how her mentation progresses        Tello Llanes MD  Hospitalist Service  [facility]  Securely message with [employer] (more info)  Text page via [employer] Paging/Directory   ______________________________________________________________________    Interval History   No acute events overnight, denies chest pain, SOB, urinary or bowel complaints    Physical Exam   Vital Signs: Temp: 97.9  F (36.6  C) Temp src: Oral BP: 112/61 Pulse: 75   Resp: 18 SpO2: 99 % O2 Device: None (Room air)    Weight: 91 lbs 0 oz    General Appearance: Not in distress  Respiratory: Clear to auscultation bilaterally, no added breath sounds  Cardiovascular: S1 and S2 well heard, no murmur or gallop  GI: Nontender, nondistended, positive bowel sounds  Skin: No rash  CNS: Alert and oriented x 3, nonfocal      Medical Decision Making       55 MINUTES SPENT BY ME on the date  of service doing chart review, history, exam, documentation & further activities per the note.      Data     I have personally reviewed the following data over the past 24 hrs:    3.9 (L)  \   10.4 (L)   / 212     140 109 (H) 8.9 /  99   4.5 22 0.74 \       Imaging results reviewed over the past 24 hrs:   No results found for this or any previous visit (from the past 24 hours).

## 2025-06-23 ENCOUNTER — APPOINTMENT (OUTPATIENT)
Dept: OCCUPATIONAL THERAPY | Facility: CLINIC | Age: 75
DRG: 100 | End: 2025-06-23
Attending: HOSPITALIST
Payer: COMMERCIAL

## 2025-06-23 ENCOUNTER — APPOINTMENT (OUTPATIENT)
Dept: PHYSICAL THERAPY | Facility: CLINIC | Age: 75
DRG: 100 | End: 2025-06-23
Attending: HOSPITALIST
Payer: COMMERCIAL

## 2025-06-23 VITALS
RESPIRATION RATE: 16 BRPM | HEIGHT: 60 IN | TEMPERATURE: 97.6 F | WEIGHT: 91 LBS | DIASTOLIC BLOOD PRESSURE: 57 MMHG | BODY MASS INDEX: 17.87 KG/M2 | OXYGEN SATURATION: 99 % | SYSTOLIC BLOOD PRESSURE: 102 MMHG | HEART RATE: 85 BPM

## 2025-06-23 LAB
ANION GAP SERPL CALCULATED.3IONS-SCNC: 10 MMOL/L (ref 7–15)
BUN SERPL-MCNC: 11.9 MG/DL (ref 8–23)
CALCIUM SERPL-MCNC: 8.5 MG/DL (ref 8.8–10.4)
CHLORIDE SERPL-SCNC: 108 MMOL/L (ref 98–107)
CREAT SERPL-MCNC: 0.64 MG/DL (ref 0.51–0.95)
EGFRCR SERPLBLD CKD-EPI 2021: >90 ML/MIN/1.73M2
ERYTHROCYTE [DISTWIDTH] IN BLOOD BY AUTOMATED COUNT: 16.3 % (ref 10–15)
GLUCOSE SERPL-MCNC: 91 MG/DL (ref 70–99)
HCO3 SERPL-SCNC: 22 MMOL/L (ref 22–29)
HCT VFR BLD AUTO: 31.9 % (ref 35–47)
HGB BLD-MCNC: 9.9 G/DL (ref 11.7–15.7)
MCH RBC QN AUTO: 23.7 PG (ref 26.5–33)
MCHC RBC AUTO-ENTMCNC: 31 G/DL (ref 31.5–36.5)
MCV RBC AUTO: 77 FL (ref 78–100)
PLATELET # BLD AUTO: 175 10E3/UL (ref 150–450)
POTASSIUM SERPL-SCNC: 3.8 MMOL/L (ref 3.4–5.3)
RBC # BLD AUTO: 4.17 10E6/UL (ref 3.8–5.2)
SODIUM SERPL-SCNC: 140 MMOL/L (ref 135–145)
WBC # BLD AUTO: 3.8 10E3/UL (ref 4–11)

## 2025-06-23 PROCEDURE — 85027 COMPLETE CBC AUTOMATED: CPT

## 2025-06-23 PROCEDURE — 97530 THERAPEUTIC ACTIVITIES: CPT | Mod: GO

## 2025-06-23 PROCEDURE — 99239 HOSP IP/OBS DSCHRG MGMT >30: CPT | Performed by: HOSPITALIST

## 2025-06-23 PROCEDURE — 97112 NEUROMUSCULAR REEDUCATION: CPT | Mod: GP

## 2025-06-23 PROCEDURE — 97530 THERAPEUTIC ACTIVITIES: CPT | Mod: GP

## 2025-06-23 PROCEDURE — 80051 ELECTROLYTE PANEL: CPT

## 2025-06-23 PROCEDURE — 36415 COLL VENOUS BLD VENIPUNCTURE: CPT

## 2025-06-23 RX ORDER — ASPIRIN 81 MG/1
81 TABLET, CHEWABLE ORAL DAILY
Qty: 60 TABLET | Refills: 1 | Status: SHIPPED | OUTPATIENT
Start: 2025-06-24

## 2025-06-23 ASSESSMENT — ACTIVITIES OF DAILY LIVING (ADL)
ADLS_ACUITY_SCORE: 34
ADLS_ACUITY_SCORE: 35
ADLS_ACUITY_SCORE: 34
ADLS_ACUITY_SCORE: 35
ADLS_ACUITY_SCORE: 34

## 2025-06-23 NOTE — CONSULTS
"SPIRITUAL HEALTH SERVICES - Consult Note  [facility] Neuroscience  Referral Source/Reason for Visit: staff referral for emotional support    Summary and Recommendations -  Several visits with Mara today. This morning, she names anticipating discharging home with home care services but name receiving home care services may conflict with her values. This afternoon, she shares that she has been accepted at [facility] and believes she will be discharging there today or tomorrow.  Mara names trauma history.  She voices finding support in her living community and in her two sons.   Mara reflects on her spirituality and belief in \"whole person\" care. 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    Plan: Will remain available. Please contact Spiritual Health as needs arise.    Anne Au  Associate     [facility] available through [employer].    Assessment    Patient/Family Understanding of Illness and Goals of Care - Mara anticipates discharging home. She also anticipates home care services but names this may conflict with her values. She reflects on her health care directive which she shares she completed 40 years ago.     Distress and Loss -   Mara is tearful as she reflects on desire for \"whole person\" care.  She also names trauma history.    Strengths, Coping, and Resources - She a supportive cooperative living community. She also names finding support in her two sons.    Meaning, Beliefs, and Spirituality - Mara reflects on being very spiritual and that her spiritual values inform her medical decision-making. She did not go into detail regarding her spiritual beliefs.    Visit ended with the arrival of PT. As time allows, will return later today.  "

## 2025-06-23 NOTE — DISCHARGE SUMMARY
[facility]    Discharge Summary  Hospitalist    Date of Admission:  6/19/2025  Date of Discharge:  6/23/2025    Discharge Diagnoses      Ischemic stroke (H)  Other specified counseling    History of Present Illness   Mara Rao is an 75 year old female who presented with acute toxic metabolic encephalopathy    Hospital Course   Mara Rao was admitted on 6/19/2025.  The following problems were addressed during her hospitalization:    Mara Rao is a 75 year old female with history of hereditary hemochromatosis who was admitted on 6/19/2025 with confusion and fatigue of unclear etiology.     -- SLUM score 14/30, updated son Jarett Rao at length, patient would benefit from placement  - Care coordination/care management consulted to assist with placement  - Patient adamantly refused treatment for UTI, no signs of severe sepsis, will continue to monitor, son updated on this.  Will reapproach if she would agree     Acute toxic metabolic encephalopathy, unclear etiology  Late subacute R frontal infarct  Chronic L temporal, L occipital lobe CVA  Patient presenting with progressive confusion, fatigue since her most recent phlebotomy in May 2025.  She was brought to the ED after a friend noticed worsening confusion.  Patient's son further reports worsening forgetfulness.  No electrolyte abnormalities, hypoxia, trauma or injury, infectious signs or symptoms, focal deficits. B12, folate, HIV normal. Stroke code called on admission with workup notable late subacute R frontal infarct and chronic L temporal, L occipital infarcts. TTE stable from prior. Discussed with Stroke Neurology and do not feel subacute R infarct is driving mental status changes. Overall unclear etiology of AMS, possible ?vascular dementia vs infectious vs metabolic vs seizure vs post-phlebotomy vs other.  -Continue ASA  -Felt that her stroke is likely to be ESus  -Follow-up EEG, UA, TSH, TSH: 1.7,  UA unremarkable  -Neurochecks q4h  -Stroke Neurology following.  Outpatient follow-up with stroke.  Would recommend cardiac monitor.  But patient refused.  -PT, OT, SW consulted  -Delirium precautions     Hereditary hemochromatosis  Post-phlebotomy anemia w/ severe fatigue  Patient with history of post-phlebotomy anemia. Last phlebotomy in May 2025. Unclear recent hemoglobin baseline, hgb ~10 this admission. Initial iron studies with likely component of iron deficiency.  -Daily CBC, follow-up ferritin  -Heme/Onc consulted:  Agree with assessment that her history of hemochromatosis and treatment with phlebotomy is likely unrelated to her admission diagnosis. Serum iron 25, TIBC 309 and iron saturation index 8% consistent with mild iron deficiency. No role for phlebotomy. Will continue to follow      UTI  --- Started ceftriaxone.  Again patient refused.                        # Cachexia: Estimated body mass index is 17.58 kg/m  as calculated from the following:    Height as of this encounter: 1.524 m (5').    Weight as of this encounter: 40.8 kg (90 lb)., PRESENT ON  --- Nutrition consulted.  Patient is very particular about the kind of food she eats and she will only eat vegan food.     Patient was eventually discharged home with home PT/OT.    Clinically Significant Risk Factors          # Hyperchloremia: Highest Cl = 108 mmol/L in last 2 days, will monitor as appropriate                         # Cachexia: Estimated body mass index is 17.77 kg/m  as calculated from the following:    Height as of this encounter: 1.524 m (5').    Weight as of this encounter: 41.3 kg (91 lb)., PRESENT ON ADMISSION     # Financial/Environmental Concerns: none          Madhuri Cannon MD, MD      Code Status   DNR / DNI       Primary Care Physician   Marilyn Chappell    Physical Exam   Temp: 98  F (36.7  C) Temp src: Oral BP: 98/50 Pulse: 77   Resp: 16 SpO2: 98 % O2 Device: None (Room air)    Vitals:    06/20/25 0330 06/21/25 1338    Weight: 40.8 kg (90 lb) 41.3 kg (91 lb)     Vital Signs with Ranges  Temp:  [97.8  F (36.6  C)-98.7  F (37.1  C)] 98  F (36.7  C)  Pulse:  [65-92] 77  Resp:  [1-22] 16  BP: ()/(44-53) 98/50  SpO2:  [98 %-100 %] 98 %  I/O last 3 completed shifts:  In: 100 [P.O.:100]  Out: 250 [Urine:250]    Physical Exam  Constitutional:       Comments: Thin and frail    Cardiovascular:      Rate and Rhythm: Normal rate and regular rhythm.      Pulses: Normal pulses.      Heart sounds: Normal heart sounds.   Pulmonary:      Effort: Pulmonary effort is normal. No respiratory distress.      Breath sounds: Normal breath sounds.   Abdominal:      General: Abdomen is flat. Bowel sounds are normal. There is no distension.      Tenderness: There is no abdominal tenderness. There is no guarding.   Skin:     General: Skin is warm and dry.   Neurological:      General: No focal deficit present.           Discharge Disposition   Discharged to home  Condition at discharge: Stable    Consultations This Hospital Stay   NEUROLOGY IP STROKE CONSULT  SPEECH LANGUAGE PATH ADULT IP CONSULT  PHARMACY IP CONSULT  PHARMACY IP CONSULT  PHYSICAL THERAPY ADULT IP CONSULT  OCCUPATIONAL THERAPY ADULT IP CONSULT  REHAB ADMISSIONS LIAISON IP CONSULT  CARE MANAGEMENT / SOCIAL WORK IP CONSULT  OCCUPATIONAL THERAPY ADULT IP CONSULT  HEMATOLOGY & ONCOLOGY IP CONSULT  SPIRITUAL HEALTH SERVICES IP CONSULT  NUTRITION SERVICES ADULT IP CONSULT  SMOKING CESSATION PROGRAM IP CONSULT    Time Spent on this Encounter   Madhuri WELLS MD, personally saw the patient today and spent greater than 30 minutes discharging this patient.    Discharge Orders      Primary Care - Care Coordination Referral      Home Care Referral      Reason for your hospital stay    stroke     Activity    Your activity upon discharge: activity as tolerated     Diet    Follow this diet upon discharge: Current Diet:Orders Placed This Encounter      Room Service      Regular Diet Adult      Stroke Hospital Follow Up (for neurologist use only)    Please be aware that coverage of these services is subject to the terms and limitations of your health insurance plan.  Call member services at your health plan with any benefit or coverage questions.  [facility] to coordinate care as prescribed by your provider. If you don t hear from a representative within 2 business days, please call [phone].       Hospital Follow-up with Existing Primary Care Provider (PCP)          ZIO PATCH MAIL OUT     ZIO PATCH MAIL OUT     Discharge Medications   Current Discharge Medication List        START taking these medications    Details   aspirin (ASA) 81 MG chewable tablet Take 1 tablet (81 mg) by mouth daily.  Qty: 60 tablet, Refills: 1    Associated Diagnoses: Ischemic stroke (H)           Allergies   No Known Allergies  Data   Recent Labs   Lab Test 06/23/25  0731 06/22/25  0834 06/21/25  0832   WBC 3.8* 4.6 3.9*   HGB 9.9* 9.9* 10.4*   MCV 77* 77* 78    192 212      Recent Labs   Lab Test 06/23/25  0731 06/22/25  0834 06/21/25  0832    139 140   POTASSIUM 3.8 4.1 4.5   CHLORIDE 108* 107 109*   CO2 22 23 22   BUN 11.9 10.4 8.9   CR 0.64 0.75 0.74   ANIONGAP 10 9 9   GIUSEPPE 8.5* 8.7* 9.0   GLC 91 95 99         Results for orders placed or performed during the hospital encounter of 06/19/25   Head CT w/o contrast    Narrative    EXAM: CT HEAD W/O CONTRAST  LOCATION: [facility]  DATE: 6/19/2025    INDICATION: Confusion  COMPARISON: CT dated 3/16/2023  TECHNIQUE: Routine CT Head without IV contrast. Multiplanar reformats. Dose reduction techniques were used.    FINDINGS:  INTRACRANIAL CONTENTS: No acute intracranial hemorrhage, extra-axial fluid collection, or mass effect. New since the prior exam, small- to moderate-sized region of gliosis and encephalomalacia centered at the right inferior frontal gyrus and frontal   operculum with involvement of the insula  inferiorly most likely represents a chronic infarct although portions demonstrate ill-defined margins with superimposed acute ischemia are not excluded. Moderate-sized chronic left posterior cerebral artery   distribution infarct involving the inferolateral aspect of the left occipital and parietal lobes, new. Otherwise, no significant brain parenchymal attenuation abnormality or evidence of an acute transcortical infarct. Slightly progressed mild generalized   cerebral parenchymal volume loss. No hydrocephalus.     VISUALIZED ORBITS/SINUSES/MASTOIDS: No acute intraorbital finding. No evidence of significant paranasal sinus or mastoid mucosal disease.     BONES/SOFT TISSUES: No acute abnormality.      Impression    IMPRESSION:  1.  New since 3/16/2023, cortical infarct centered at the right inferior frontal gyrus and frontal operculum, age-indeterminate although likely chronic in nature. A brain MRI can be helpful to exclude superimposed acute ischemia, as clinically indicated.  2.  Moderately-sized chronic left PCA distribution infarct, new.  3.  No acute intracranial hemorrhage or mass effect.   MR Brain w/o & w Contrast    Narrative    EXAM: MR BRAIN W/O and W CONTRAST, MRA BRAIN ([place]) W/O CONTRAST, MRA NECK (CAROTIDS) W/O and W CONTRAST  LOCATION: [facility]  DATE: 6/19/2025    INDICATION: confusion, difficulty word finding  COMPARISON: CTA head and neck 3/16/2023  CONTRAST: 10 [place]avist  TECHNIQUE:   1) Routine multiplanar multisequence head MRI without and with intravenous contrast.  2) 3D time-of-flight head MRA without intravenous contrast.  3) Neck MRA without and with IV contrast. Stenosis measurements made according to NASCET criteria unless otherwise specified.    FINDINGS:  HEAD MRI:  INTRACRANIAL CONTENTS: 3.8 x 3 cm (AP by TR) late subacute infarct in the right frontal operculum with cortical laminar necrosis. No mass, acute hemorrhage, or extra-axial fluid  collections. Scattered nonspecific T2/FLAIR hyperintensities within the   cerebral white matter most consistent with mild chronic microvascular ischemic change. Chronic infarct left temporal/occipital lobe. Normal ventricles and sulci. Normal position of the cerebellar tonsils. No pathologic contrast enhancement.    SELLA: No abnormality accounting for technique.    OSSEOUS STRUCTURES/SOFT TISSUES: Diffusely heterogeneous, but overall benign marrow signal pattern. The major intracranial vascular flow voids are maintained.     ORBITS: No abnormality accounting for technique.     SINUSES/MASTOIDS: No paranasal sinus mucosal disease. No middle ear or mastoid effusion.     HEAD MRA:   ANTERIOR CIRCULATION: No stenosis/occlusion, aneurysm, or high flow vascular malformation. Standard [place] of [place] anatomy.    POSTERIOR CIRCULATION: No stenosis/occlusion, aneurysm, or high flow vascular malformation. Dominant right and smaller left vertebral artery contribute to a normal basilar artery.     NECK MRA:   RIGHT CAROTID: No measurable stenosis or dissection.    LEFT CAROTID: No measurable stenosis or dissection.    VERTEBRAL ARTERIES: No focal stenosis or dissection. Dominant right and smaller left vertebral arteries.    AORTIC ARCH: Classic aortic arch anatomy with no significant stenosis at the origin of the great vessels.      Impression    IMPRESSION:  HEAD MRI:  1.  3.8 x 3 cm (AP x TR) late subacute infarct in the right frontal operculum with cortical laminar necrosis.  2.  Chronic infarct left temporal/occipital lobe.  3.  Mild age-related changes.    HEAD MRA:  No stenosis/occlusion, aneurysm, or high flow vascular malformation.    NECK MRA:  No measurable stenosis or dissection.     MRA Angiogram Head w/o Contrast    Narrative    EXAM: MR BRAIN W/O and W CONTRAST, MRA BRAIN ([place]) W/O CONTRAST, MRA NECK (CAROTIDS) W/O and W CONTRAST  LOCATION: [facility]  DATE:  6/19/2025    INDICATION: confusion, difficulty word finding  COMPARISON: CTA head and neck 3/16/2023  CONTRAST: 10 Gadavist  TECHNIQUE:   1) Routine multiplanar multisequence head MRI without and with intravenous contrast.  2) 3D time-of-flight head MRA without intravenous contrast.  3) Neck MRA without and with IV contrast. Stenosis measurements made according to NASCET criteria unless otherwise specified.    FINDINGS:  HEAD MRI:  INTRACRANIAL CONTENTS: 3.8 x 3 cm (AP by TR) late subacute infarct in the right frontal operculum with cortical laminar necrosis. No mass, acute hemorrhage, or extra-axial fluid collections. Scattered nonspecific T2/FLAIR hyperintensities within the   cerebral white matter most consistent with mild chronic microvascular ischemic change. Chronic infarct left temporal/occipital lobe. Normal ventricles and sulci. Normal position of the cerebellar tonsils. No pathologic contrast enhancement.    SELLA: No abnormality accounting for technique.    OSSEOUS STRUCTURES/SOFT TISSUES: Diffusely heterogeneous, but overall benign marrow signal pattern. The major intracranial vascular flow voids are maintained.     ORBITS: No abnormality accounting for technique.     SINUSES/MASTOIDS: No paranasal sinus mucosal disease. No middle ear or mastoid effusion.     HEAD MRA:   ANTERIOR CIRCULATION: No stenosis/occlusion, aneurysm, or high flow vascular malformation. Standard [place] of [place] anatomy.    POSTERIOR CIRCULATION: No stenosis/occlusion, aneurysm, or high flow vascular malformation. Dominant right and smaller left vertebral artery contribute to a normal basilar artery.     NECK MRA:   RIGHT CAROTID: No measurable stenosis or dissection.    LEFT CAROTID: No measurable stenosis or dissection.    VERTEBRAL ARTERIES: No focal stenosis or dissection. Dominant right and smaller left vertebral arteries.    AORTIC ARCH: Classic aortic arch anatomy with no significant stenosis at the origin of the great  vessels.      Impression    IMPRESSION:  HEAD MRI:  1.  3.8 x 3 cm (AP x TR) late subacute infarct in the right frontal operculum with cortical laminar necrosis.  2.  Chronic infarct left temporal/occipital lobe.  3.  Mild age-related changes.    HEAD MRA:  No stenosis/occlusion, aneurysm, or high flow vascular malformation.    NECK MRA:  No measurable stenosis or dissection.     MRA Angiogram Neck w/o & w Contrast    Narrative    EXAM: MR BRAIN W/O and W CONTRAST, MRA BRAIN ([place]) W/O CONTRAST, MRA NECK (CAROTIDS) W/O and W CONTRAST  LOCATION: [facility]  DATE: 6/19/2025    INDICATION: confusion, difficulty word finding  COMPARISON: CTA head and neck 3/16/2023  CONTRAST: 10 Gadavist  TECHNIQUE:   1) Routine multiplanar multisequence head MRI without and with intravenous contrast.  2) 3D time-of-flight head MRA without intravenous contrast.  3) Neck MRA without and with IV contrast. Stenosis measurements made according to NASCET criteria unless otherwise specified.    FINDINGS:  HEAD MRI:  INTRACRANIAL CONTENTS: 3.8 x 3 cm (AP by TR) late subacute infarct in the right frontal operculum with cortical laminar necrosis. No mass, acute hemorrhage, or extra-axial fluid collections. Scattered nonspecific T2/FLAIR hyperintensities within the   cerebral white matter most consistent with mild chronic microvascular ischemic change. Chronic infarct left temporal/occipital lobe. Normal ventricles and sulci. Normal position of the cerebellar tonsils. No pathologic contrast enhancement.    SELLA: No abnormality accounting for technique.    OSSEOUS STRUCTURES/SOFT TISSUES: Diffusely heterogeneous, but overall benign marrow signal pattern. The major intracranial vascular flow voids are maintained.     ORBITS: No abnormality accounting for technique.     SINUSES/MASTOIDS: No paranasal sinus mucosal disease. No middle ear or mastoid effusion.     HEAD MRA:   ANTERIOR CIRCULATION: No  stenosis/occlusion, aneurysm, or high flow vascular malformation. Standard [place] of [place] anatomy.    POSTERIOR CIRCULATION: No stenosis/occlusion, aneurysm, or high flow vascular malformation. Dominant right and smaller left vertebral artery contribute to a normal basilar artery.     NECK MRA:   RIGHT CAROTID: No measurable stenosis or dissection.    LEFT CAROTID: No measurable stenosis or dissection.    VERTEBRAL ARTERIES: No focal stenosis or dissection. Dominant right and smaller left vertebral arteries.    AORTIC ARCH: Classic aortic arch anatomy with no significant stenosis at the origin of the great vessels.      Impression    IMPRESSION:  HEAD MRI:  1.  3.8 x 3 cm (AP x TR) late subacute infarct in the right frontal operculum with cortical laminar necrosis.  2.  Chronic infarct left temporal/occipital lobe.  3.  Mild age-related changes.    HEAD MRA:  No stenosis/occlusion, aneurysm, or high flow vascular malformation.    NECK MRA:  No measurable stenosis or dissection.     Echocardiogram Complete - For age > 60 yrs     Value    LVEF  55-60%    Narrative    [id number]  [id number]  [id number]  [id number]^SHANE^BETTY^SANTIAGO     [facility]  Echocardiography Laboratory  [address] [place] [address]     Name: MARIO HOANG  MRN: [id number]  : 1950  Study Date: 2025 12:56 PM  Age: 75 yrs  Gender: Female  Patient Location: [facility]  Reason For Study: CVA  Ordering Physician: BETTY LYNN  Referring Physician: BETTY LYNN  Performed By: Gianluca Norman     BSA: 1.3 m2  Height: 60 in  Weight: 92 lb  HR: 71  BP: 123/57 mmHg  ______________________________________________________________________________  Procedure  Echocardiogram with two-dimensional, color and spectral Doppler.  ______________________________________________________________________________  Interpretation Summary     Technically difficult imaging  A cardiac source of embolus was not identified.  Left  ventricular systolic function is normal.  The visual ejection fraction is 55-60%.  The left ventricle is normal in size.  Doppler interrogation does not demonstrate signficant stenosis or  insufficiency inovlving cardiac valves     No change since 3/16/2023  ______________________________________________________________________________  Left Ventricle  The left ventricle is normal in size. There is normal left ventricular wall  thickness. Left ventricular systolic function is normal. The visual ejection  fraction is 55-60%. Left ventricular diastolic function is normal. Diastolic  Doppler findings (E/E' ratio and/or other parameters) suggest left ventricular  filling pressures are normal. No regional wall motion abnormalities noted.  There is no thrombus seen in the left ventricle.     Right Ventricle  The right ventricle is normal in structure, function and size. There is no  mass or thrombus in the right ventricle.     Atria  Normal left atrial size. Right atrial size is normal. There is no atrial shunt  seen. The left atrial appendage is not well visualized.     Mitral Valve  The mitral valve leaflets appear normal. There is no evidence of stenosis,  fluttering, or prolapse. There is no mitral regurgitation noted. There is no  mitral valve stenosis.     Tricuspid Valve  Normal tricuspid valve. No tricuspid regurgitation. Right ventricular systolic  pressure could not be approximated due to inadequate tricuspid regurgitation.  There is no tricuspid stenosis.     Aortic Valve  The aortic valve is trileaflet. No aortic regurgitation is present. No aortic  stenosis is present.     Pulmonic Valve  The pulmonic valve is not well visualized.     Vessels  The aortic root is normal size. Normal size ascending aorta. The inferior vena  cava is normal. The pulmonary artery is normal size.     Pericardium  The pericardium appears normal. There is no pleural effusion.     Rhythm  Sinus rhythm was  noted.  ______________________________________________________________________________  MMode/2D Measurements & Calculations  Ao root diam: 3.2 cm     LVOT diam: 1.8 cm  LVOT area: 2.7 cm2  Ao root diam index Ht(cm/m): 2.1  Ao root diam index BSA (cm/m2): 2.4  LA Volume (BP): 34.0 ml  LA Volume Index (BP): 25.4 ml/m2  TAPSE: 2.2 cm     Doppler Measurements & Calculations  MV E max mathew: 83.1 cm/sec  MV A max mathew: 72.8 cm/sec  MV E/A: 1.1  MV dec slope: 513.2 cm/sec2  MV dec time: 0.16 sec     Ao V2 max: 118.1 cm/sec  Ao max P.0 mmHg  ALEYDA(V,D): 2.2 cm2  LV V1 max PG: 3.7 mmHg  LV V1 max: 95.5 cm/sec  LV V1 VTI: 21.8 cm  SV(LVOT): 58.0 ml  SI(LVOT): 43.2 ml/m2  AV Mathew Ratio (DI): 0.81  E/E' av.5  Lateral E/e': 7.6  Medial E/e': 11.4     ______________________________________________________________________________  Report approved by: Dr. Chalo Banerjee on 2025 03:00 PM

## 2025-06-23 NOTE — DISCHARGE INSTRUCTIONS
Stroke Education Note    The following information was reviewed with patient:    - Individualized risk factors for stroke:   {patient's risk factors:294127}    - Warning signs and symptoms of stroke:   B = Balance loss   E = Eyesight changes   F = Facial droop or numbness   A = Arm or leg weakness   S = Speech difficulty, slurred speech   T = Time to call 911 for help    Written stroke educational materials given:   - Learning about BE FAST: Stroke Warning Signs and Learning about Risk Factors for Stroke (Healthwise)   - Understanding Stroke: Key Resources After a Stroke (FOD #166843)    Learner's response to education:     desire to change     Eugenia Ayers, RN

## 2025-06-23 NOTE — PLAN OF CARE
Pt here with acute toxic metabolic encephalopathy, unclear etiology.   A&O x4, forgetful.   Spiritual Health consulted, SH spoke to patient on phone yesterday, patient requested to see SH in person on Monday also.  VSS, on RA.   LS clear.   Tele NSR.   CMS and Neuro's intact.  Up SBA w/ GB.   Cont of B&B.   +UTI, patient has refused IV Rocephin, along with oral meds, MD aware.  +BS, last BM 6/19 per patient.   Reg diet, she has a Vegan for years, and only eats food family brings in for her, see Nutrition's note.     Pt scoring green on Aggression Stop Light Tool. Recommending TCU at time of discharge.

## 2025-06-23 NOTE — PLAN OF CARE
Goal Outcome Evaluation:      Plan of Care Reviewed With: patient    Overall Patient Progress: no changeOverall Patient Progress: no change    Pt rested well overnight. A/Ox4. VSS. Neuros intact. CMS intact. Denies pain. Ambulating A1 gb/walker to BR, voiding adequately. IV SL. Tolerating diet. Discharge to TCU pending .

## 2025-06-23 NOTE — PROGRESS NOTES
Progress note    Mara Rao MRN# [id number]   YOB: 1950 Age: 75 year old   Date of Admission: 6/19/2025  Requesting physician: Dr. Cannon  Reason for consult: Post phlebotomy fatigue and confusion       Primary Hematologist: Dr. 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 At the last visit, we proposed taking less volume at each phlebotomy (250 mL) going forward.    Anemia  - She has iron deficiency after repeated phlebotomies   - Hemoglobin on 5/29 was 10.3 g/dL with MCV 79  - Iron studies 5/29- total iron 47, TIBC 344, % saturation 14 and ferritin 6.2    CVA  Encephalopathy  - Medicine and Neurology work up in process  - With reduced levels of iron, I would not expect the hemachromatosis to be the cause of her mental status symptoms     Agreed with ASA, pt is not sure, she will think about it.    Will follow peripherally.    Arcelia Solis MD  [facility]  [phone] (office)              Chief Complaint:   Psychiatric Evaluation and Memory Loss         History of Present Illness:   Mara presents to the hospital due to subacute changes in mood and memory.    In the ED, she underwent labs and imaging. She has been evaluated by Neurology. She has a history of hemachromatosis needing periodic phlebotomies. We were asked to see her regarding labs and her symptoms    She was last seen in our office on 5/29/2025. Her last phlebotomy was 3/2025.  At that visit, with a low hemoglobin and low ferritin further phlebotomies were put on hold until her next labs and follow up in 8/2025. He had planned on cutting back on the amount of blood taken due to her post-phlebotomy symptoms.    She is not sure about starting aspirin, she  is planning to speak with  today         Physical Exam:   Vitals were reviewed  Blood pressure 98/50, pulse 77, temperature 98  F (36.7  C), temperature source Oral, resp. rate 16, height 1.524 m (5'), weight 41.3 kg (91 lb), SpO2 98%.  Temperatures:  Current - Temp: 98  F (36.7  C); Max - Temp  Av  F (36.7  C)  Min: 98  F (36.7  C)  Max: 98  F (36.7  C)  Respiration range: Resp  Av.8  Min: 12  Max: 16  Pulse range: Pulse  Av.5  Min: 65  Max: 82  Blood pressure range: Systolic (24hrs), Av , Min:90 , Max:147   ; Diastolic (24hrs), Av, Min:42, Max:62    Pulse oximetry range: SpO2  Av.5 %  Min: 97 %  Max: 100 %  No intake or output data in the 24 hours ending 25 1531    GENERAL: No acute distress.  NEURO: non focal              Past Medical History:   I have reviewed this patient's past medical history  No past medical history on file.          Past Surgical History:   I have reviewed this patient's past surgical history  No past surgical history on file.            Social History:   I have reviewed this patient's social history  Social History     Tobacco Use    Smoking status: Not on file    Smokeless tobacco: Not on file   Substance Use Topics    Alcohol use: Not on file             Family History:   I have reviewed this patient's family history  No family history on file.          Allergies:   No Known Allergies          Medications:   I have reviewed this patient's current medications  No medications prior to admission.     Current Facility-Administered Medications   Medication Dose Route Frequency Provider Last Rate Last Admin    aspirin (ASA) chewable tablet 81 mg  81 mg Oral Daily Kathy Leyva PA-C        cefTRIAXone (ROCEPHIN) 1 g vial to attach to  mL bag for ADULTS or NS 50 mL bag for PEDS  1 g Intravenous Q24H Tello Llanes MD        lidocaine (LMX4) cream   Topical Q1H PRN Madhuri Cannon MD        lidocaine 1 % 0.1-1 mL  0.1-1 mL Other Q1H  Madhuri Richter MD        sodium chloride (PF) 0.9% PF flush 3 mL  3 mL Intracatheter Q8H [facility] Madhuri Cannon MD   3 mL at 06/23/25 0524    sodium chloride (PF) 0.9% PF flush 3 mL  3 mL Intracatheter q1 min Madhuri Richter MD                 Review of Systems:     The 10 point Review of Systems is negative other than noted in the HPI.            Data:   Data   Results for orders placed or performed during the hospital encounter of 06/19/25 (from the past 24 hours)   CBC with platelets   Result Value Ref Range    WBC Count 3.8 (L) 4.0 - 11.0 10e3/uL    RBC Count 4.17 3.80 - 5.20 10e6/uL    Hemoglobin 9.9 (L) 11.7 - 15.7 g/dL    Hematocrit 31.9 (L) 35.0 - 47.0 %    MCV 77 (L) 78 - 100 fL    MCH 23.7 (L) 26.5 - 33.0 pg    MCHC 31.0 (L) 31.5 - 36.5 g/dL    RDW 16.3 (H) 10.0 - 15.0 %    Platelet Count 175 150 - 450 10e3/uL   Basic metabolic panel   Result Value Ref Range    Sodium 140 135 - 145 mmol/L    Potassium 3.8 3.4 - 5.3 mmol/L    Chloride 108 (H) 98 - 107 mmol/L    Carbon Dioxide (CO2) 22 22 - 29 mmol/L    Anion Gap 10 7 - 15 mmol/L    Urea Nitrogen 11.9 8.0 - 23.0 mg/dL    Creatinine 0.64 0.51 - 0.95 mg/dL    GFR Estimate >90 >60 mL/min/1.73m2    Calcium 8.5 (L) 8.8 - 10.4 mg/dL    Glucose 91 70 - 99 mg/dL
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
## 2025-06-24 NOTE — PLAN OF CARE
"Occupational Therapy Discharge Summary    Reason for therapy discharge:    Discharged to home with home therapy.    Progress towards therapy goal(s). See goals on Care Plan in [facility] electronic health record for goal details.  Goals partially met.  Barriers to achieving goals:   discharge from facility.    Therapy recommendation(s):    Continued therapy is recommended.  Rationale/Recommendations:  No discharge note from provider in chart. Appears pt returned home, unclear if 24/7 supervision was secured. Therapy was recommending the following, \"Pt lives alone at baseline, still working as a homecare nurse. Slow progressive decline over last 1-2 years with memory per son report, significant worse over past week. Today scored a 14/30 on SLUMS, at this time recommend 24/7 supervision or TCU as pt would likely be unsafe to drive, cooking or complete IADLs. If home will require HH OT/ RN for safety checks and family daily check-ins if home care not daily. Highly recommend 24h family support. If cognition does not improve, likely will need more supportive living environment.\".      "

## 2025-06-24 NOTE — PROGRESS NOTES
Care Management Discharge Note    Discharge Date: 06/23/2025       Discharge Disposition: Home Care    Discharge Services: Home Care    Discharge DME:      Discharge Transportation: family or friend will provide    Private pay costs discussed: Not applicable    Does the patient's insurance plan have a 3 day qualifying hospital stay waiver?  No    PAS Confirmation Code:    Patient/family educated on Medicare website which has current facility and service quality ratings:      Education Provided on the Discharge Plan: Yes  Persons Notified of Discharge Plans: patient, bedside RN, charge Nurse  Patient/Family in Agreement with the Plan: yes    Handoff Referral Completed: No, handoff not indicated or clinically appropriate    Additional Information:       Writer faxed home care orders to [facility]. Son to take her to her condo.    Aby Pastrana RN BSN  Care Coordination  [facility]

## 2025-06-25 ENCOUNTER — PATIENT OUTREACH (OUTPATIENT)
Dept: CARE COORDINATION | Facility: CLINIC | Age: 75
End: 2025-06-25
Payer: COMMERCIAL

## 2025-06-25 NOTE — PLAN OF CARE
Physical Therapy Discharge Summary    Reason for therapy discharge:    Discharged to home with home therapy.    Progress towards therapy goal(s). See goals on Care Plan in [facility] electronic health record for goal details.  Goals partially met.  Barriers to achieving goals:   discharge from facility.    Therapy recommendation(s):    Continued therapy is recommended.  Rationale/Recommendations:   . Pt tolerated session well. Pt appears to be mobilizing close to baseline. Pt limited by cognition, decreased activity tolerance. 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 Pt currently ind for transfers, SBA -ind for ambulation. Anticipate when medically ready, pt may discharge to home with assist as needed, per OT pt would require 24/7 supervision. If pt unable to have that at home, likely would benefit from more supportive living environment ([facility]). Will continue to update as appropriate. Pt close to meeting all PT goals.  PT Brief overview of current status: SBA- ind amb  PT Total Distance Amb During Session (feet): 250          Recommendation above provided by last treating therapist.

## 2025-07-08 ENCOUNTER — ALLIED HEALTH/NURSE VISIT (OUTPATIENT)
Dept: CARDIOLOGY | Facility: CLINIC | Age: 75
End: 2025-07-08
Payer: COMMERCIAL

## 2025-07-08 DIAGNOSIS — R42 DIZZINESS: Primary | ICD-10-CM

## 2025-07-08 NOTE — PROGRESS NOTES
Courtesy placement of ziopatch [id number]..  Patient to wear ziopatch for 14 days-removed on 7-22-25.

## 2025-07-30 LAB — CV ZIO PRELIM RESULTS: NORMAL

## 2025-07-31 ENCOUNTER — RESULTS FOLLOW-UP (OUTPATIENT)
Dept: FAMILY MEDICINE | Facility: CLINIC | Age: 75
End: 2025-07-31
Payer: COMMERCIAL